# Patient Record
Sex: FEMALE | Race: WHITE | NOT HISPANIC OR LATINO
[De-identification: names, ages, dates, MRNs, and addresses within clinical notes are randomized per-mention and may not be internally consistent; named-entity substitution may affect disease eponyms.]

---

## 2023-10-18 PROBLEM — Z00.00 ENCOUNTER FOR PREVENTIVE HEALTH EXAMINATION: Status: ACTIVE | Noted: 2023-10-18

## 2023-10-19 ENCOUNTER — APPOINTMENT (OUTPATIENT)
Dept: SURGERY | Facility: CLINIC | Age: 63
End: 2023-10-19
Payer: COMMERCIAL

## 2023-10-19 VITALS
SYSTOLIC BLOOD PRESSURE: 151 MMHG | DIASTOLIC BLOOD PRESSURE: 80 MMHG | WEIGHT: 145 LBS | BODY MASS INDEX: 26.68 KG/M2 | HEART RATE: 96 BPM | HEIGHT: 62 IN

## 2023-10-19 DIAGNOSIS — E66.3 OVERWEIGHT: ICD-10-CM

## 2023-10-19 DIAGNOSIS — Z86.79 PERSONAL HISTORY OF OTHER DISEASES OF THE CIRCULATORY SYSTEM: ICD-10-CM

## 2023-10-19 DIAGNOSIS — H43.399 OTHER VITREOUS OPACITIES, UNSPECIFIED EYE: ICD-10-CM

## 2023-10-19 DIAGNOSIS — K57.30 DIVERTICULOSIS OF LARGE INTESTINE W/OUT PERFORATION OR ABSCESS W/OUT BLEEDING: ICD-10-CM

## 2023-10-19 DIAGNOSIS — N60.12 DIFFUSE CYSTIC MASTOPATHY OF LEFT BREAST: ICD-10-CM

## 2023-10-19 DIAGNOSIS — K80.50 CALCULUS OF BILE DUCT W/OUT CHOLANGITIS OR CHOLECYSTITIS W/OUT OBSTRUCTION: ICD-10-CM

## 2023-10-19 DIAGNOSIS — N60.11 DIFFUSE CYSTIC MASTOPATHY OF RIGHT BREAST: ICD-10-CM

## 2023-10-19 DIAGNOSIS — Q43.8 OTHER SPECIFIED CONGENITAL MALFORMATIONS OF INTESTINE: ICD-10-CM

## 2023-10-19 PROCEDURE — 99204 OFFICE O/P NEW MOD 45 MIN: CPT

## 2023-10-19 RX ORDER — L. ACIDOPHILUS/L.BULGARICUS 1MM CELL
TABLET ORAL
Refills: 0 | Status: ACTIVE | COMMUNITY

## 2023-10-19 RX ORDER — LOSARTAN POTASSIUM 50 MG/1
50 TABLET, FILM COATED ORAL
Refills: 0 | Status: ACTIVE | COMMUNITY

## 2023-10-20 ENCOUNTER — NON-APPOINTMENT (OUTPATIENT)
Age: 63
End: 2023-10-20

## 2023-11-07 ENCOUNTER — APPOINTMENT (OUTPATIENT)
Dept: SURGERY | Facility: HOSPITAL | Age: 63
End: 2023-11-07

## 2023-11-07 ENCOUNTER — TRANSCRIPTION ENCOUNTER (OUTPATIENT)
Age: 63
End: 2023-11-07

## 2023-11-10 ENCOUNTER — APPOINTMENT (OUTPATIENT)
Dept: GYNECOLOGIC ONCOLOGY | Facility: CLINIC | Age: 63
End: 2023-11-10
Payer: COMMERCIAL

## 2023-11-10 VITALS
TEMPERATURE: 99.1 F | DIASTOLIC BLOOD PRESSURE: 79 MMHG | HEART RATE: 87 BPM | SYSTOLIC BLOOD PRESSURE: 144 MMHG | WEIGHT: 143 LBS | BODY MASS INDEX: 26.31 KG/M2 | OXYGEN SATURATION: 98 % | HEIGHT: 62 IN

## 2023-11-10 DIAGNOSIS — Z80.51 FAMILY HISTORY OF MALIGNANT NEOPLASM OF KIDNEY: ICD-10-CM

## 2023-11-10 DIAGNOSIS — R87.629 UNSPECIFIED ABNORMAL CYTOLOGICAL FINDINGS IN SPECIMENS FROM VAGINA: ICD-10-CM

## 2023-11-10 DIAGNOSIS — Z84.1 FAMILY HISTORY OF DISORDERS OF KIDNEY AND URETER: ICD-10-CM

## 2023-11-10 DIAGNOSIS — Z80.3 FAMILY HISTORY OF MALIGNANT NEOPLASM OF BREAST: ICD-10-CM

## 2023-11-10 DIAGNOSIS — Z80.8 FAMILY HISTORY OF MALIGNANT NEOPLASM OF OTHER ORGANS OR SYSTEMS: ICD-10-CM

## 2023-11-10 DIAGNOSIS — Z83.49 FAMILY HISTORY OF OTHER ENDOCRINE, NUTRITIONAL AND METABOLIC DISEASES: ICD-10-CM

## 2023-11-10 DIAGNOSIS — Z82.3 FAMILY HISTORY OF STROKE: ICD-10-CM

## 2023-11-10 DIAGNOSIS — Z82.49 FAMILY HISTORY OF ISCHEMIC HEART DISEASE AND OTHER DISEASES OF THE CIRCULATORY SYSTEM: ICD-10-CM

## 2023-11-10 PROCEDURE — 99205 OFFICE O/P NEW HI 60 MIN: CPT

## 2023-11-28 ENCOUNTER — RESULT REVIEW (OUTPATIENT)
Age: 63
End: 2023-11-28

## 2023-12-01 LAB — CANCER AG125 SERPL-ACNC: 609 U/ML

## 2023-12-04 ENCOUNTER — APPOINTMENT (OUTPATIENT)
Dept: GYNECOLOGIC ONCOLOGY | Facility: CLINIC | Age: 63
End: 2023-12-04
Payer: COMMERCIAL

## 2023-12-04 DIAGNOSIS — K57.32 DIVERTICULITIS OF LARGE INTESTINE W/OUT PERFORATION OR ABSCESS W/OUT BLEEDING: ICD-10-CM

## 2023-12-04 PROCEDURE — 99214 OFFICE O/P EST MOD 30 MIN: CPT | Mod: 95

## 2023-12-05 ENCOUNTER — NON-APPOINTMENT (OUTPATIENT)
Age: 63
End: 2023-12-05

## 2023-12-05 ENCOUNTER — APPOINTMENT (OUTPATIENT)
Dept: GYNECOLOGIC ONCOLOGY | Facility: CLINIC | Age: 63
End: 2023-12-05
Payer: COMMERCIAL

## 2023-12-05 VITALS
HEIGHT: 62 IN | HEART RATE: 76 BPM | DIASTOLIC BLOOD PRESSURE: 76 MMHG | BODY MASS INDEX: 26.68 KG/M2 | SYSTOLIC BLOOD PRESSURE: 182 MMHG | WEIGHT: 145 LBS | OXYGEN SATURATION: 99 % | TEMPERATURE: 96.5 F

## 2023-12-05 DIAGNOSIS — Z13.1 ENCOUNTER FOR SCREENING FOR DIABETES MELLITUS: ICD-10-CM

## 2023-12-05 DIAGNOSIS — Z01.818 ENCOUNTER FOR OTHER PREPROCEDURAL EXAMINATION: ICD-10-CM

## 2023-12-05 DIAGNOSIS — R19.00 INTRA-ABDOMINAL AND PELVIC SWELLING, MASS AND LUMP, UNSPECIFIED SITE: ICD-10-CM

## 2023-12-05 PROCEDURE — 99215 OFFICE O/P EST HI 40 MIN: CPT

## 2023-12-06 ENCOUNTER — TRANSCRIPTION ENCOUNTER (OUTPATIENT)
Age: 63
End: 2023-12-06

## 2023-12-06 LAB
AFP-TM SERPL-MCNC: 4.7 NG/ML
ALBUMIN SERPL ELPH-MCNC: 5.1 G/DL
ALP BLD-CCNC: 100 U/L
ALT SERPL-CCNC: 24 U/L
ANION GAP SERPL CALC-SCNC: 18 MMOL/L
APTT BLD: 32.8 SEC
AST SERPL-CCNC: 23 U/L
BASOPHILS # BLD AUTO: 0.1 K/UL
BASOPHILS NFR BLD AUTO: 1.5 %
BILIRUB SERPL-MCNC: 0.5 MG/DL
BUN SERPL-MCNC: 13 MG/DL
CALCIUM SERPL-MCNC: 10.4 MG/DL
CANCER AG125 SERPL-ACNC: 771 U/ML
CANCER AG19-9 SERPL-ACNC: 12 U/ML
CEA SERPL-MCNC: 1.4 NG/ML
CHLORIDE SERPL-SCNC: 101 MMOL/L
CO2 SERPL-SCNC: 22 MMOL/L
CREAT SERPL-MCNC: 0.66 MG/DL
EGFR: 100 ML/MIN/1.73M2
EOSINOPHIL # BLD AUTO: 0.07 K/UL
EOSINOPHIL NFR BLD AUTO: 1 %
ESTIMATED AVERAGE GLUCOSE: 114 MG/DL
GLUCOSE SERPL-MCNC: 92 MG/DL
HBA1C MFR BLD HPLC: 5.6 %
HCT VFR BLD CALC: 44.6 %
HGB BLD-MCNC: 14.1 G/DL
IMM GRANULOCYTES NFR BLD AUTO: 0.4 %
INR PPP: 0.92 RATIO
LDH SERPL-CCNC: 245 U/L
LYMPHOCYTES # BLD AUTO: 1.54 K/UL
LYMPHOCYTES NFR BLD AUTO: 23 %
MAN DIFF?: NORMAL
MCHC RBC-ENTMCNC: 28.8 PG
MCHC RBC-ENTMCNC: 31.6 GM/DL
MCV RBC AUTO: 91 FL
MONOCYTES # BLD AUTO: 0.61 K/UL
MONOCYTES NFR BLD AUTO: 9.1 %
NEUTROPHILS # BLD AUTO: 4.35 K/UL
NEUTROPHILS NFR BLD AUTO: 65 %
PLATELET # BLD AUTO: 294 K/UL
POTASSIUM SERPL-SCNC: 4.7 MMOL/L
PROT SERPL-MCNC: 7.8 G/DL
PT BLD: 10.5 SEC
RBC # BLD: 4.9 M/UL
RBC # FLD: 13.4 %
SODIUM SERPL-SCNC: 141 MMOL/L
WBC # FLD AUTO: 6.7 K/UL

## 2023-12-11 LAB — INHIBIN B: <7 PG/ML

## 2023-12-13 VITALS
WEIGHT: 140.88 LBS | HEIGHT: 62 IN | DIASTOLIC BLOOD PRESSURE: 83 MMHG | RESPIRATION RATE: 18 BRPM | HEART RATE: 88 BPM | OXYGEN SATURATION: 100 % | TEMPERATURE: 98 F | SYSTOLIC BLOOD PRESSURE: 162 MMHG

## 2023-12-13 NOTE — PATIENT PROFILE ADULT - FOOD INSECURITY
Erector spinae Procedure Note    Pre-Procedure   Staff -        CRNA: Terrell Abbott APRN CRNA       Performed By: CRNA       Location: pre-op       Procedure Start/Stop Times: 7/17/2023 1:47 PM and 7/17/2023 1:55 PM       Pre-Anesthestic Checklist: patient identified, IV checked, site marked, risks and benefits discussed, informed consent, monitors and equipment checked, pre-op evaluation, at physician/surgeon's request and post-op pain management  Timeout:       Correct Patient: Yes        Correct Procedure: Yes        Correct Site: Yes        Correct Position: Yes        Correct Laterality: Yes        Site Marked: Yes  Procedure Documentation  Procedure: Erector spinae       Diagnosis: HEMICOLECTOMY       Laterality: bilateral       Patient Position: sitting       Patient Prep/Sterile Barriers: sterile gloves, mask, patient draped       Skin prep: Chloraprep       Insertion Site: T7-8, T10-11.       Needle Type: insulated       Needle Gauge: 20.        Needle Length (Inches): 4        Ultrasound guided       1. Ultrasound was used to identify targeted nerve, plexus, vascular marker, or fascial plane and place a needle adjacent to it in real-time.       2. Ultrasound was used to visualize the spread of anesthetic in close proximity to the above referenced structure.       3. A permanent image is entered into the patient's record.       4. The visualized anatomic structures appeared normal.       5. There were no apparent abnormal pathologic findings.    Assessment/Narrative         The placement was negative for: blood aspirated, painful injection and site bleeding       Paresthesias: No.       Bolus given via needle. no blood aspirated via catheter.        Secured via.        Insertion/Infusion Method: Single Shot       Complications: none       Injection made incrementally with aspirations every 5 mL.    Medication(s) Administered   Bupivacaine 0.25% PF (Infiltration) - Infiltration, Back   50 mL -  "7/17/2023 1:47:00 PM  Dexamethasone 10 mg/mL PF (Perineural) - Infiltration, Back   10 mg - 7/17/2023 1:47:00 PM  Bupivacaine liposome (Exparel) 1.3% LA inj susp (Infiltration) - Infiltration, Back   20 mL - 7/17/2023 1:47:00 PM  Medication Administration Time: 7/17/2023 1:47 PM      FOR [facility] ([facility]) ONLY:   Pain Team Contact information: please page the Pain Team Via [employer]. Search \"Pain\". During daytime hours, please page the attending first. At night please page the resident first.      " no

## 2023-12-13 NOTE — PATIENT PROFILE ADULT - FALL HARM RISK - UNIVERSAL INTERVENTIONS
Bed in lowest position, wheels locked, appropriate side rails in place/Call bell, personal items and telephone in reach/Instruct patient to call for assistance before getting out of bed or chair/Non-slip footwear when patient is out of bed/[place] to call system/Physically safe environment - no spills, clutter or unnecessary equipment/Purposeful Proactive Rounding/Room/bathroom lighting operational, light cord in reach Bed in lowest position, wheels locked, appropriate side rails in place/Call bell, personal items and telephone in reach/Instruct patient to call for assistance before getting out of bed or chair/Non-slip footwear when patient is out of bed/[place] to call system/Physically safe environment - no spills, clutter or unnecessary equipment/Purposeful Proactive Rounding/Room/bathroom lighting operational, light cord in reach

## 2023-12-13 NOTE — PATIENT PROFILE ADULT - FUNCTIONAL ASSESSMENT - BASIC MOBILITY SCORE.
24 Oxybutynin Pregnancy And Lactation Text: This medication is Pregnancy Category B and is considered safe during pregnancy. It is unknown if it is excreted in breast milk.

## 2023-12-13 NOTE — PRE-OP CHECKLIST - 3.
Darin Hooper (Eddie) - # DOS Darin Hooper ([facility]) - # [id number] Darin Hooper ([facility]) - # [phone]

## 2023-12-13 NOTE — PRE-OP CHECKLIST - 2.
EMERGENCY CONTACT - Rosalind Olmedoyariyuliya (Daughter) - [phone] EMERGENCY CONTACT - Rosalind Olmedoyariyuliya (Daughter) - [phone]

## 2023-12-14 ENCOUNTER — APPOINTMENT (OUTPATIENT)
Dept: GYNECOLOGIC ONCOLOGY | Facility: HOSPITAL | Age: 63
End: 2023-12-14
Payer: COMMERCIAL

## 2023-12-14 ENCOUNTER — INPATIENT (INPATIENT)
Facility: HOSPITAL | Age: 63
LOS: 2 days | Discharge: HOME | DRG: 737 | End: 2023-12-17
Attending: OBSTETRICS & GYNECOLOGY | Admitting: OBSTETRICS & GYNECOLOGY
Payer: COMMERCIAL

## 2023-12-14 ENCOUNTER — TRANSCRIPTION ENCOUNTER (OUTPATIENT)
Age: 63
End: 2023-12-14

## 2023-12-14 ENCOUNTER — RESULT REVIEW (OUTPATIENT)
Age: 63
End: 2023-12-14

## 2023-12-14 DIAGNOSIS — Z98.890 OTHER SPECIFIED POSTPROCEDURAL STATES: Chronic | ICD-10-CM

## 2023-12-14 DIAGNOSIS — R74.01 ELEVATION OF LEVELS OF LIVER TRANSAMINASE LEVELS: ICD-10-CM

## 2023-12-14 DIAGNOSIS — C56.9 MALIGNANT NEOPLASM OF UNSPECIFIED OVARY: ICD-10-CM

## 2023-12-14 DIAGNOSIS — C78.6 SECONDARY MALIGNANT NEOPLASM OF RETROPERITONEUM AND PERITONEUM: ICD-10-CM

## 2023-12-14 DIAGNOSIS — Z87.81 PERSONAL HISTORY OF (HEALED) TRAUMATIC FRACTURE: Chronic | ICD-10-CM

## 2023-12-14 DIAGNOSIS — I10 ESSENTIAL (PRIMARY) HYPERTENSION: ICD-10-CM

## 2023-12-14 DIAGNOSIS — R18.8 OTHER ASCITES: ICD-10-CM

## 2023-12-14 DIAGNOSIS — C79.9 SECONDARY MALIGNANT NEOPLASM OF UNSPECIFIED SITE: ICD-10-CM

## 2023-12-14 LAB
ALBUMIN SERPL ELPH-MCNC: 3.9 G/DL — SIGNIFICANT CHANGE UP (ref 3.3–5)
ALBUMIN SERPL ELPH-MCNC: 3.9 G/DL — SIGNIFICANT CHANGE UP (ref 3.3–5)
ALP SERPL-CCNC: 80 U/L — SIGNIFICANT CHANGE UP (ref 40–120)
ALP SERPL-CCNC: 80 U/L — SIGNIFICANT CHANGE UP (ref 40–120)
ALT FLD-CCNC: 223 U/L — HIGH (ref 10–45)
ALT FLD-CCNC: 223 U/L — HIGH (ref 10–45)
ANION GAP SERPL CALC-SCNC: 11 MMOL/L — SIGNIFICANT CHANGE UP (ref 5–17)
ANION GAP SERPL CALC-SCNC: 11 MMOL/L — SIGNIFICANT CHANGE UP (ref 5–17)
AST SERPL-CCNC: 255 U/L — HIGH (ref 10–40)
AST SERPL-CCNC: 255 U/L — HIGH (ref 10–40)
BILIRUB SERPL-MCNC: 0.3 MG/DL — SIGNIFICANT CHANGE UP (ref 0.2–1.2)
BILIRUB SERPL-MCNC: 0.3 MG/DL — SIGNIFICANT CHANGE UP (ref 0.2–1.2)
BLD GP AB SCN SERPL QL: NEGATIVE — SIGNIFICANT CHANGE UP
BLD GP AB SCN SERPL QL: NEGATIVE — SIGNIFICANT CHANGE UP
BUN SERPL-MCNC: 12 MG/DL — SIGNIFICANT CHANGE UP (ref 7–23)
BUN SERPL-MCNC: 12 MG/DL — SIGNIFICANT CHANGE UP (ref 7–23)
CALCIUM SERPL-MCNC: 8.8 MG/DL — SIGNIFICANT CHANGE UP (ref 8.4–10.5)
CALCIUM SERPL-MCNC: 8.8 MG/DL — SIGNIFICANT CHANGE UP (ref 8.4–10.5)
CHLORIDE SERPL-SCNC: 105 MMOL/L — SIGNIFICANT CHANGE UP (ref 96–108)
CHLORIDE SERPL-SCNC: 105 MMOL/L — SIGNIFICANT CHANGE UP (ref 96–108)
CO2 SERPL-SCNC: 21 MMOL/L — LOW (ref 22–31)
CO2 SERPL-SCNC: 21 MMOL/L — LOW (ref 22–31)
CREAT SERPL-MCNC: 0.62 MG/DL — SIGNIFICANT CHANGE UP (ref 0.5–1.3)
CREAT SERPL-MCNC: 0.62 MG/DL — SIGNIFICANT CHANGE UP (ref 0.5–1.3)
EGFR: 101 ML/MIN/1.73M2 — SIGNIFICANT CHANGE UP
EGFR: 101 ML/MIN/1.73M2 — SIGNIFICANT CHANGE UP
GLUCOSE BLDC GLUCOMTR-MCNC: 106 MG/DL — HIGH (ref 70–99)
GLUCOSE BLDC GLUCOMTR-MCNC: 106 MG/DL — HIGH (ref 70–99)
GLUCOSE SERPL-MCNC: 160 MG/DL — HIGH (ref 70–99)
GLUCOSE SERPL-MCNC: 160 MG/DL — HIGH (ref 70–99)
HCT VFR BLD CALC: 39.4 % — SIGNIFICANT CHANGE UP (ref 34.5–45)
HCT VFR BLD CALC: 39.4 % — SIGNIFICANT CHANGE UP (ref 34.5–45)
HGB BLD-MCNC: 13 G/DL — SIGNIFICANT CHANGE UP (ref 11.5–15.5)
HGB BLD-MCNC: 13 G/DL — SIGNIFICANT CHANGE UP (ref 11.5–15.5)
MAGNESIUM SERPL-MCNC: 1.6 MG/DL — SIGNIFICANT CHANGE UP (ref 1.6–2.6)
MAGNESIUM SERPL-MCNC: 1.6 MG/DL — SIGNIFICANT CHANGE UP (ref 1.6–2.6)
MCHC RBC-ENTMCNC: 29.2 PG — SIGNIFICANT CHANGE UP (ref 27–34)
MCHC RBC-ENTMCNC: 29.2 PG — SIGNIFICANT CHANGE UP (ref 27–34)
MCHC RBC-ENTMCNC: 33 GM/DL — SIGNIFICANT CHANGE UP (ref 32–36)
MCHC RBC-ENTMCNC: 33 GM/DL — SIGNIFICANT CHANGE UP (ref 32–36)
MCV RBC AUTO: 88.5 FL — SIGNIFICANT CHANGE UP (ref 80–100)
MCV RBC AUTO: 88.5 FL — SIGNIFICANT CHANGE UP (ref 80–100)
NRBC # BLD: 0 /100 WBCS — SIGNIFICANT CHANGE UP (ref 0–0)
NRBC # BLD: 0 /100 WBCS — SIGNIFICANT CHANGE UP (ref 0–0)
PHOSPHATE SERPL-MCNC: 3.6 MG/DL — SIGNIFICANT CHANGE UP (ref 2.5–4.5)
PHOSPHATE SERPL-MCNC: 3.6 MG/DL — SIGNIFICANT CHANGE UP (ref 2.5–4.5)
PLATELET # BLD AUTO: 265 K/UL — SIGNIFICANT CHANGE UP (ref 150–400)
PLATELET # BLD AUTO: 265 K/UL — SIGNIFICANT CHANGE UP (ref 150–400)
POTASSIUM SERPL-MCNC: 3.8 MMOL/L — SIGNIFICANT CHANGE UP (ref 3.5–5.3)
POTASSIUM SERPL-MCNC: 3.8 MMOL/L — SIGNIFICANT CHANGE UP (ref 3.5–5.3)
POTASSIUM SERPL-SCNC: 3.8 MMOL/L — SIGNIFICANT CHANGE UP (ref 3.5–5.3)
POTASSIUM SERPL-SCNC: 3.8 MMOL/L — SIGNIFICANT CHANGE UP (ref 3.5–5.3)
PROT SERPL-MCNC: 6.5 G/DL — SIGNIFICANT CHANGE UP (ref 6–8.3)
PROT SERPL-MCNC: 6.5 G/DL — SIGNIFICANT CHANGE UP (ref 6–8.3)
RBC # BLD: 4.45 M/UL — SIGNIFICANT CHANGE UP (ref 3.8–5.2)
RBC # BLD: 4.45 M/UL — SIGNIFICANT CHANGE UP (ref 3.8–5.2)
RBC # FLD: 13 % — SIGNIFICANT CHANGE UP (ref 10.3–14.5)
RBC # FLD: 13 % — SIGNIFICANT CHANGE UP (ref 10.3–14.5)
RH IG SCN BLD-IMP: POSITIVE — SIGNIFICANT CHANGE UP
RH IG SCN BLD-IMP: POSITIVE — SIGNIFICANT CHANGE UP
SODIUM SERPL-SCNC: 137 MMOL/L — SIGNIFICANT CHANGE UP (ref 135–145)
SODIUM SERPL-SCNC: 137 MMOL/L — SIGNIFICANT CHANGE UP (ref 135–145)
WBC # BLD: 16 K/UL — HIGH (ref 3.8–10.5)
WBC # BLD: 16 K/UL — HIGH (ref 3.8–10.5)
WBC # FLD AUTO: 16 K/UL — HIGH (ref 3.8–10.5)
WBC # FLD AUTO: 16 K/UL — HIGH (ref 3.8–10.5)

## 2023-12-14 PROCEDURE — 88307 TISSUE EXAM BY PATHOLOGIST: CPT | Mod: 26

## 2023-12-14 PROCEDURE — 88309 TISSUE EXAM BY PATHOLOGIST: CPT | Mod: 26

## 2023-12-14 PROCEDURE — 39560 RESECT DIAPHRAGM SIMPLE: CPT | Mod: 62

## 2023-12-14 PROCEDURE — 88304 TISSUE EXAM BY PATHOLOGIST: CPT | Mod: 26

## 2023-12-14 PROCEDURE — 58953 TAH RAD DISSECT FOR DEBULK: CPT

## 2023-12-14 PROCEDURE — 58952 RESECT OVARIAN MALIGNANCY: CPT

## 2023-12-14 PROCEDURE — 71045 X-RAY EXAM CHEST 1 VIEW: CPT | Mod: 26

## 2023-12-14 PROCEDURE — 88305 TISSUE EXAM BY PATHOLOGIST: CPT | Mod: 26

## 2023-12-14 PROCEDURE — 88331 PATH CONSLTJ SURG 1 BLK 1SPC: CPT | Mod: 26

## 2023-12-14 PROCEDURE — 58953 TAH RAD DISSECT FOR DEBULK: CPT | Mod: 82

## 2023-12-14 RX ORDER — LOSARTAN POTASSIUM 100 MG/1
50 TABLET, FILM COATED ORAL DAILY
Refills: 0 | Status: DISCONTINUED | OUTPATIENT
Start: 2023-12-14 | End: 2023-12-17

## 2023-12-14 RX ORDER — HYDRALAZINE HCL 50 MG
5 TABLET ORAL ONCE
Refills: 0 | Status: COMPLETED | OUTPATIENT
Start: 2023-12-14 | End: 2023-12-14

## 2023-12-14 RX ORDER — ACETAMINOPHEN 500 MG
1000 TABLET ORAL ONCE
Refills: 0 | Status: COMPLETED | OUTPATIENT
Start: 2023-12-14 | End: 2023-12-14

## 2023-12-14 RX ORDER — HYDROMORPHONE HYDROCHLORIDE 2 MG/ML
0.5 INJECTION INTRAMUSCULAR; INTRAVENOUS; SUBCUTANEOUS
Refills: 0 | Status: DISCONTINUED | OUTPATIENT
Start: 2023-12-14 | End: 2023-12-14

## 2023-12-14 RX ORDER — SODIUM CHLORIDE 9 MG/ML
1000 INJECTION, SOLUTION INTRAVENOUS
Refills: 0 | Status: DISCONTINUED | OUTPATIENT
Start: 2023-12-14 | End: 2023-12-15

## 2023-12-14 RX ORDER — SIMETHICONE 80 MG/1
80 TABLET, CHEWABLE ORAL EVERY 6 HOURS
Refills: 0 | Status: DISCONTINUED | OUTPATIENT
Start: 2023-12-14 | End: 2023-12-17

## 2023-12-14 RX ORDER — OXYCODONE HYDROCHLORIDE 5 MG/1
5 TABLET ORAL EVERY 4 HOURS
Refills: 0 | Status: DISCONTINUED | OUTPATIENT
Start: 2023-12-14 | End: 2023-12-17

## 2023-12-14 RX ORDER — HEPARIN SODIUM 5000 [USP'U]/ML
5000 INJECTION INTRAVENOUS; SUBCUTANEOUS ONCE
Refills: 0 | Status: COMPLETED | OUTPATIENT
Start: 2023-12-14 | End: 2023-12-14

## 2023-12-14 RX ORDER — KETOROLAC TROMETHAMINE 30 MG/ML
15 SYRINGE (ML) INJECTION EVERY 8 HOURS
Refills: 0 | Status: DISCONTINUED | OUTPATIENT
Start: 2023-12-14 | End: 2023-12-15

## 2023-12-14 RX ORDER — CELECOXIB 200 MG/1
400 CAPSULE ORAL ONCE
Refills: 0 | Status: COMPLETED | OUTPATIENT
Start: 2023-12-14 | End: 2023-12-14

## 2023-12-14 RX ORDER — SODIUM CHLORIDE 9 MG/ML
1000 INJECTION, SOLUTION INTRAVENOUS
Refills: 0 | Status: DISCONTINUED | OUTPATIENT
Start: 2023-12-14 | End: 2023-12-14

## 2023-12-14 RX ORDER — MAGNESIUM SULFATE 500 MG/ML
2 VIAL (ML) INJECTION ONCE
Refills: 0 | Status: COMPLETED | OUTPATIENT
Start: 2023-12-14 | End: 2023-12-14

## 2023-12-14 RX ORDER — OXYCODONE HYDROCHLORIDE 5 MG/1
10 TABLET ORAL EVERY 4 HOURS
Refills: 0 | Status: DISCONTINUED | OUTPATIENT
Start: 2023-12-14 | End: 2023-12-17

## 2023-12-14 RX ORDER — ONDANSETRON 8 MG/1
8 TABLET, FILM COATED ORAL EVERY 8 HOURS
Refills: 0 | Status: DISCONTINUED | OUTPATIENT
Start: 2023-12-14 | End: 2023-12-17

## 2023-12-14 RX ORDER — METOCLOPRAMIDE HCL 10 MG
10 TABLET ORAL EVERY 8 HOURS
Refills: 0 | Status: DISCONTINUED | OUTPATIENT
Start: 2023-12-14 | End: 2023-12-17

## 2023-12-14 RX ORDER — ACETAMINOPHEN 500 MG
1000 TABLET ORAL EVERY 6 HOURS
Refills: 0 | Status: COMPLETED | OUTPATIENT
Start: 2023-12-14 | End: 2023-12-15

## 2023-12-14 RX ORDER — BACILLUS COAGULANS/INULIN 1B-250 MG
1 CAPSULE ORAL
Refills: 0 | DISCHARGE

## 2023-12-14 RX ORDER — LOSARTAN POTASSIUM 100 MG/1
1 TABLET, FILM COATED ORAL
Refills: 0 | DISCHARGE

## 2023-12-14 RX ADMIN — OXYCODONE HYDROCHLORIDE 10 MILLIGRAM(S): 5 TABLET ORAL at 20:56

## 2023-12-14 RX ADMIN — HYDROMORPHONE HYDROCHLORIDE 0.5 MILLIGRAM(S): 2 INJECTION INTRAMUSCULAR; INTRAVENOUS; SUBCUTANEOUS at 16:35

## 2023-12-14 RX ADMIN — LOSARTAN POTASSIUM 50 MILLIGRAM(S): 100 TABLET, FILM COATED ORAL at 18:37

## 2023-12-14 RX ADMIN — Medication 15 MILLIGRAM(S): at 21:58

## 2023-12-14 RX ADMIN — HYDROMORPHONE HYDROCHLORIDE 0.5 MILLIGRAM(S): 2 INJECTION INTRAMUSCULAR; INTRAVENOUS; SUBCUTANEOUS at 17:59

## 2023-12-14 RX ADMIN — HEPARIN SODIUM 5000 UNIT(S): 5000 INJECTION INTRAVENOUS; SUBCUTANEOUS at 10:37

## 2023-12-14 RX ADMIN — HYDROMORPHONE HYDROCHLORIDE 0.5 MILLIGRAM(S): 2 INJECTION INTRAMUSCULAR; INTRAVENOUS; SUBCUTANEOUS at 18:25

## 2023-12-14 RX ADMIN — OXYCODONE HYDROCHLORIDE 10 MILLIGRAM(S): 5 TABLET ORAL at 19:50

## 2023-12-14 RX ADMIN — HYDROMORPHONE HYDROCHLORIDE 0.5 MILLIGRAM(S): 2 INJECTION INTRAMUSCULAR; INTRAVENOUS; SUBCUTANEOUS at 17:20

## 2023-12-14 RX ADMIN — Medication 25 GRAM(S): at 19:50

## 2023-12-14 RX ADMIN — HYDROMORPHONE HYDROCHLORIDE 0.5 MILLIGRAM(S): 2 INJECTION INTRAMUSCULAR; INTRAVENOUS; SUBCUTANEOUS at 16:50

## 2023-12-14 RX ADMIN — CELECOXIB 400 MILLIGRAM(S): 200 CAPSULE ORAL at 10:29

## 2023-12-14 RX ADMIN — Medication 5 MILLIGRAM(S): at 18:28

## 2023-12-14 RX ADMIN — Medication 1000 MILLIGRAM(S): at 21:58

## 2023-12-14 RX ADMIN — Medication 1000 MILLIGRAM(S): at 10:29

## 2023-12-14 RX ADMIN — HYDROMORPHONE HYDROCHLORIDE 0.5 MILLIGRAM(S): 2 INJECTION INTRAMUSCULAR; INTRAVENOUS; SUBCUTANEOUS at 17:53

## 2023-12-14 NOTE — BRIEF OPERATIVE NOTE - NSICDXBRIEFPROCEDURE_GEN_ALL_CORE_FT
PROCEDURES:  Laparotomy, exploratory, with abdominal hysterectomy, and BSO 14-Dec-2023 16:05:25  [place]  Omentectomy 14-Dec-2023 16:05:31  [place]

## 2023-12-14 NOTE — DISCHARGE NOTE PROVIDER - NSDCMRMEDTOKEN_GEN_ALL_CORE_FT
losartan 50 mg oral tablet: 1 tab(s) orally once a day  Probiotic Formula (Bacillus Coagulans) oral capsule: 1 cap(s) orally   acetaminophen 325 mg oral tablet: 2 tab(s) orally every 6 hours  ibuprofen 600 mg oral tablet: 1 tab(s) orally every 6 hours  losartan 50 mg oral tablet: 1 tab(s) orally once a day  Lovenox 40 mg/0.4 mL injectable solution: 40 milligram(s) subcutaneously once a day (at bedtime)  Probiotic Formula (Bacillus Coagulans) oral capsule: 1 cap(s) orally

## 2023-12-14 NOTE — DISCHARGE NOTE PROVIDER - NSDCCPCAREPLAN_GEN_ALL_CORE_FT
PRINCIPAL DISCHARGE DIAGNOSIS  Diagnosis: Status post total hysterectomy  Assessment and Plan of Treatment:

## 2023-12-14 NOTE — BRIEF OPERATIVE NOTE - SPECIMENS
1. Right fallopian tube and ovary 2. Left fallopian tube and ovary 3. Uterus and cervix 4. Omentum 5. Right diaphragmatic nodule 6. Right diaphragmatic nodule #2 7. Nodule over inferior vena cava

## 2023-12-14 NOTE — DISCHARGE NOTE PROVIDER - NSDCFUSCHEDAPPT_GEN_ALL_CORE_FT
[facility]  GYNONC [address]  Scheduled Appointment: 12/21/2023    Carey Del Toro  [facility]  GYNONC [address]  Scheduled Appointment: 01/12/2024     [facility]  GYNONC [address]  Scheduled Appointment: 12/21/2023    Carey Del Toro  [facility]  GYNONC [address]  Scheduled Appointment: 01/12/2024

## 2023-12-14 NOTE — H&P ADULT - ASSESSMENT
64yo P3 admitted post-operatively after scheduled diagnostic laparoscopy, exploratory laparotomy, total abdominal hysterectomy bilateral salpingo-oophorectomy, omentectomy, removal of diaphragmatic nodule and repair of defect EBL 100ml    Plan   - Clear liquid diet  - Stat labs in PACU  - Portable CXR in PACU to check for resolution of pneumothorax, and repeat CXR 12/15 AM  - ERAS protocol  - Starting Hb 14.1, Hct 44   - Starting Cr 0.6  - Admit to telemetry    FRANKIE Kelly  Gynecologic Oncology Fellow   62yo P3 admitted post-operatively after scheduled diagnostic laparoscopy, exploratory laparotomy, total abdominal hysterectomy bilateral salpingo-oophorectomy, omentectomy, removal of diaphragmatic nodule and repair of defect EBL 100ml    Plan   - Clear liquid diet  - Stat labs in PACU  - Portable CXR in PACU to check for resolution of pneumothorax, and repeat CXR 12/15 AM  - ERAS protocol  - Starting Hb 14.1, Hct 44   - Starting Cr 0.6  - Admit to telemetry    FRANKIE Kelly  Gynecologic Oncology Fellow

## 2023-12-14 NOTE — DISCHARGE NOTE PROVIDER - DISCHARGE SERVICE FOR PATIENT
on the discharge service for the patient. I have reviewed and made amendments to the documentation where necessary. Detail Level: Detailed Quality 131: Pain Assessment And Follow-Up: Pain assessment NOT documented as being performed, documentation the patient is not eligible for a pain assessment using a standardized tool Quality 110: Preventive Care And Screening: Influenza Immunization: Influenza Immunization Administered during Influenza season

## 2023-12-14 NOTE — DISCHARGE NOTE PROVIDER - NSDCCPTREATMENT_GEN_ALL_CORE_FT
PRINCIPAL PROCEDURE  Procedure: Laparotomy, exploratory, with abdominal hysterectomy, and BSO  Findings and Treatment:       SECONDARY PROCEDURE  Procedure: Laparotomy, exploratory, with abdominal hysterectomy, and BSO  Findings and Treatment:     Procedure: Omentectomy  Findings and Treatment:

## 2023-12-14 NOTE — DISCHARGE NOTE PROVIDER - CARE PROVIDER_API CALL
Carey Del Toro.  Gynecologic Oncology  [address]  [place] [address]  Phone: [phone]  Fax: [phone]  Follow Up Time:    Carey Del Toro.  Gynecologic Oncology  [address]  [place] [address]  Phone: [phone]  Fax: [phone]  Follow Up Time:

## 2023-12-14 NOTE — PRE-ANESTHESIA EVALUATION ADULT - NSANTHOSAYNRD_GEN_A_CORE
No. BETHANIE screening performed.  STOP BANG Legend: 0-2 = LOW Risk; 3-4 = INTERMEDIATE Risk; 5-8 = HIGH Risk

## 2023-12-14 NOTE — DISCHARGE NOTE PROVIDER - HOSPITAL COURSE
Patient underwent an uncomplicated dx l/s, ex-lap, JASMYN, BSO, omentectomy, removal of diaphragmatic nodule and repair of defect by CT surgery and surg/onc. Patient’s postoperative course was unremarkable and she remained hemodynamically stable and afebrile throughout. Upon discharge the patient is ambulating and voiding spontaneously, tolerating oral intake, pain was well controlled with oral medication, and vital signs were stable.   Patient underwent an uncomplicated dx l/s, ex-lap, JASMYN, BSO, omentectomy, removal of diaphragmatic nodule and repair of defect by CT surgery and surg/onc. Patient’s postoperative course was unremarkable and she remained hemodynamically stable and afebrile throughout. Upon discharge the patient is ambulating and voiding spontaneously, tolerating oral intake, pain was well controlled with oral medication, and vital signs were stable. Pt discharged to home with lovenox and GYN Onc follow up.

## 2023-12-14 NOTE — DISCHARGE NOTE PROVIDER - CARE PROVIDERS DIRECT ADDRESSES
[email or web address]

## 2023-12-14 NOTE — H&P ADULT - HISTORY OF PRESENT ILLNESS
Patient is a 62yo P3 admitted post-operatively after scheduled diagnostic laparoscopy, exploratory laparotomy, total abdominal hysterectomy bilateral salpingo-oophorectomy, omentectomy, removal of diaphragmatic nodule and repair of defect    Problem:  1) Peritoneal Carcinomatosis  2) Elevated  609 (11/10/23)  3) Genetic testing neg > 10 years ago.  ?  Previous Therapies:  1) TVUS 10/30/23:  Uterus: 7.8 cm x 4.3 cm x 4.4 cm. 3.0 cm fundal fibroid. 7.2 cm right fundal pedunculated fibroid on a stalk.  Endometrium: 2 mm. Within normal limits. Trace fluid in the cavity  Right ovary: 3.4 cm x 2.2 cm x 2.1 cm within normal limits  Left ovary: Not seen 8.9 cm heterogeneous left adnexal mass of uncertain etiology  Fluid: Trace free fluid.  Impression:  Enlarged multifibroid uterus including a 7.2 cm dominant right fundal pedunculated fibroid on a stalk. 8.9 cm heterogeneous left adnexal mass of uncertain etiology. Left ovary was not visualized. Pedunculated fibroid versus left adnexal mass is not excluded. Recommend MRI pelvis with and without contrast for further clarification.  ?  2) CT 10/17/23:  Lower chest: No significant abnormality  Liver: Elongated right hepatic lobe 21 cm craniocaudad. No focal lesion  Gallbladder: Numerous count oculi and lumen of normal sized gallbladder. Slight gallbladder wall thickening. No pericholecystic fluid  Spleen: Normal in size and configuration  Pancreas: Unremarkable  Adrenals: No mass  Kidneys/ureters: No calculi, mass, or hydronephrosis  Bladder: Prominently fluid distended but otherwise grossly unremarkable  Reproductive organs: Uterine fundus slightly to right of midline. Lobulated right superior para uterine mass measuring 7 x 5.5 x 7 cm.  Bowel: Diverticuli in sigmoid colon with long segmental wall thickening of the upper sigmoid colon and prominent pericolic inflammatory change. No bowel obstruction. Appendix appears normal in caliber  Peritoneum: Small amount of free fluid in the posterior pelvis. No free air  Vessels: No aneurysm. Mild arthrosclerotic calcification.  Retroperitoneum/lymph nodes: No lymphadenopathy  Abdominal wall: No abnormal mass or fluid collection  Bones: No fracture or focal destructive or blastic lesion  Impression:  Findings compatible with acute diverticulitis of the upper sigmoid colon. Cholelithiasis and slight gallbladder wall thickening. No pericholecystic fluid or biliary duct dilation  A lobulated right superior periuterine mass measuring 7 x 5.5 x 7 cm may be a pedunculated myoma, with masses of other etiology not excluded. Follow-up pelvic ultrasound can be performed for further evaluation in this regard  ?  3) MRI 23:  FINDINGS:  UTERUS: 8.6 x 3.7 x 3.5 cm. Fundal intramural/subserosal fibroid, 3.5 x 2.6 cm (401, 32)  ENDOMETRIUM: Within normal limits.  JUNCTIONAL ZONE: Within normal limits.  RIGHT OVARY: 8.5 x 7.0 x 7.5 cm (TV, AP, CC) predominantly solid mass replacing the right ovary with cystic components. Dominant 4 cm cystic component contains mildly T1 hyperintense proteinaceous fluid.  LEFT OVARY: 8.0 x 5.3 x 4.0 cm (AP, CC, TV) predominantly solid mass replacing the left ovary with small cystic components.  ADNEXA: As above.  BLADDER: Within normal limits.  LYMPH NODES: No pelvic lymphadenopathy.  VISUALIZED PORTIONS:  ABDOMINAL ORGANS: Within normal limits.  BOWEL: Within normal limits.  PERITONEUM: Small volume ascites. Findings suspicious for peritoneal carcinomatosis, subtle peritoneal enhancement and at least one discrete peritoneal nodule identified, a 5 mm posterior pelvic nodule (301, 25).  VESSELS: Within normal small fat-containing umbilical hernia.  BONES: No aggressive osseous lesion.  IMPRESSION:  Bilateral ovarian masses and findings suspicious for peritoneal carcinomatosis with small volume ascites. In light of the elevated CA-125 levels, primary ovarian malignancy is most likely although differential would include ovarian metastases.    GYNHx; h/o 1 abnormal pap in 2019- HPV +, colpo and f/b cryo. h/o fibroid 11 years ago. denies ovarian cyst.  LMP 2017  OBHx:  x 3  ?  PMHx: Congenital redundant colon, HTN (gallstones- talked to a surgeon who recommended sx before it gets worst)  Meds: Losartan 50mg daily, probiotic  Allergies: Azithromycin, Cephalexin, Avocado- pruritis, Hives  ?  Sx: Breast Surgery Lumpectomy  ?  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  Colonoscopy: - normal repeat 10 years  PAP: 2023- normal  Mammo: 2023- abnormal- biopsy negative.    Physical Exam  Gen: Well-appearing. NAD.  Resp: Breathing comfortably on RA.     Patient is a 62yo P3 admitted post-operatively after scheduled diagnostic laparoscopy, exploratory laparotomy, total abdominal hysterectomy bilateral salpingo-oophorectomy, omentectomy, removal of diaphragmatic nodule and repair of defect    Problem:  1) Peritoneal Carcinomatosis  2) Elevated  609 (11/10/23)  3) Genetic testing neg > 10 years ago.  ?  Previous Therapies:  1) TVUS 10/30/23:  Uterus: 7.8 cm x 4.3 cm x 4.4 cm. 3.0 cm fundal fibroid. 7.2 cm right fundal pedunculated fibroid on a stalk.  Endometrium: 2 mm. Within normal limits. Trace fluid in the cavity  Right ovary: 3.4 cm x 2.2 cm x 2.1 cm within normal limits  Left ovary: Not seen 8.9 cm heterogeneous left adnexal mass of uncertain etiology  Fluid: Trace free fluid.  Impression:  Enlarged multifibroid uterus including a 7.2 cm dominant right fundal pedunculated fibroid on a stalk. 8.9 cm heterogeneous left adnexal mass of uncertain etiology. Left ovary was not visualized. Pedunculated fibroid versus left adnexal mass is not excluded. Recommend MRI pelvis with and without contrast for further clarification.  ?  2) CT 10/17/23:  Lower chest: No significant abnormality  Liver: Elongated right hepatic lobe 21 cm craniocaudad. No focal lesion  Gallbladder: Numerous count oculi and lumen of normal sized gallbladder. Slight gallbladder wall thickening. No pericholecystic fluid  Spleen: Normal in size and configuration  Pancreas: Unremarkable  Adrenals: No mass  Kidneys/ureters: No calculi, mass, or hydronephrosis  Bladder: Prominently fluid distended but otherwise grossly unremarkable  Reproductive organs: Uterine fundus slightly to right of midline. Lobulated right superior para uterine mass measuring 7 x 5.5 x 7 cm.  Bowel: Diverticuli in sigmoid colon with long segmental wall thickening of the upper sigmoid colon and prominent pericolic inflammatory change. No bowel obstruction. Appendix appears normal in caliber  Peritoneum: Small amount of free fluid in the posterior pelvis. No free air  Vessels: No aneurysm. Mild arthrosclerotic calcification.  Retroperitoneum/lymph nodes: No lymphadenopathy  Abdominal wall: No abnormal mass or fluid collection  Bones: No fracture or focal destructive or blastic lesion  Impression:  Findings compatible with acute diverticulitis of the upper sigmoid colon. Cholelithiasis and slight gallbladder wall thickening. No pericholecystic fluid or biliary duct dilation  A lobulated right superior periuterine mass measuring 7 x 5.5 x 7 cm may be a pedunculated myoma, with masses of other etiology not excluded. Follow-up pelvic ultrasound can be performed for further evaluation in this regard  ?  3) MRI 23:  FINDINGS:  UTERUS: 8.6 x 3.7 x 3.5 cm. Fundal intramural/subserosal fibroid, 3.5 x 2.6 cm (401, 32)  ENDOMETRIUM: Within normal limits.  JUNCTIONAL ZONE: Within normal limits.  RIGHT OVARY: 8.5 x 7.0 x 7.5 cm (TV, AP, CC) predominantly solid mass replacing the right ovary with cystic components. Dominant 4 cm cystic component contains mildly T1 hyperintense proteinaceous fluid.  LEFT OVARY: 8.0 x 5.3 x 4.0 cm (AP, CC, TV) predominantly solid mass replacing the left ovary with small cystic components.  ADNEXA: As above.  BLADDER: Within normal limits.  LYMPH NODES: No pelvic lymphadenopathy.  VISUALIZED PORTIONS:  ABDOMINAL ORGANS: Within normal limits.  BOWEL: Within normal limits.  PERITONEUM: Small volume ascites. Findings suspicious for peritoneal carcinomatosis, subtle peritoneal enhancement and at least one discrete peritoneal nodule identified, a 5 mm posterior pelvic nodule (301, 25).  VESSELS: Within normal small fat-containing umbilical hernia.  BONES: No aggressive osseous lesion.  IMPRESSION:  Bilateral ovarian masses and findings suspicious for peritoneal carcinomatosis with small volume ascites. In light of the elevated CA-125 levels, primary ovarian malignancy is most likely although differential would include ovarian metastases.    GYNHx; h/o 1 abnormal pap in 2019- HPV +, colpo and f/b cryo. h/o fibroid 11 years ago. denies ovarian cyst.  LMP 2017  OBHx:  x 3  ?  PMHx: Congenital redundant colon, HTN (gallstones- talked to a surgeon who recommended sx before it gets worst)  Meds: Losartan 50mg daily, probiotic  Allergies: Azithromycin, Cephalexin, Avocado- pruritis, Hives  ?  Sx: Breast Surgery Lumpectomy  ?  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  Colonoscopy: - normal repeat 10 years  PAP: 2023- normal  Mammo: 2023- abnormal- biopsy negative.    Physical Exam  Gen: Well-appearing. NAD.  Resp: Breathing comfortably on RA.

## 2023-12-15 LAB
ALBUMIN SERPL ELPH-MCNC: 3.5 G/DL — SIGNIFICANT CHANGE UP (ref 3.3–5)
ALBUMIN SERPL ELPH-MCNC: 3.5 G/DL — SIGNIFICANT CHANGE UP (ref 3.3–5)
ALP SERPL-CCNC: 65 U/L — SIGNIFICANT CHANGE UP (ref 40–120)
ALP SERPL-CCNC: 65 U/L — SIGNIFICANT CHANGE UP (ref 40–120)
ALT FLD-CCNC: 153 U/L — HIGH (ref 10–45)
ALT FLD-CCNC: 153 U/L — HIGH (ref 10–45)
ANION GAP SERPL CALC-SCNC: 9 MMOL/L — SIGNIFICANT CHANGE UP (ref 5–17)
ANION GAP SERPL CALC-SCNC: 9 MMOL/L — SIGNIFICANT CHANGE UP (ref 5–17)
AST SERPL-CCNC: 128 U/L — HIGH (ref 10–40)
AST SERPL-CCNC: 128 U/L — HIGH (ref 10–40)
BILIRUB SERPL-MCNC: 0.8 MG/DL — SIGNIFICANT CHANGE UP (ref 0.2–1.2)
BILIRUB SERPL-MCNC: 0.8 MG/DL — SIGNIFICANT CHANGE UP (ref 0.2–1.2)
BUN SERPL-MCNC: 9 MG/DL — SIGNIFICANT CHANGE UP (ref 7–23)
BUN SERPL-MCNC: 9 MG/DL — SIGNIFICANT CHANGE UP (ref 7–23)
CALCIUM SERPL-MCNC: 8.6 MG/DL — SIGNIFICANT CHANGE UP (ref 8.4–10.5)
CALCIUM SERPL-MCNC: 8.6 MG/DL — SIGNIFICANT CHANGE UP (ref 8.4–10.5)
CHLORIDE SERPL-SCNC: 101 MMOL/L — SIGNIFICANT CHANGE UP (ref 96–108)
CHLORIDE SERPL-SCNC: 101 MMOL/L — SIGNIFICANT CHANGE UP (ref 96–108)
CO2 SERPL-SCNC: 25 MMOL/L — SIGNIFICANT CHANGE UP (ref 22–31)
CO2 SERPL-SCNC: 25 MMOL/L — SIGNIFICANT CHANGE UP (ref 22–31)
CREAT SERPL-MCNC: 0.62 MG/DL — SIGNIFICANT CHANGE UP (ref 0.5–1.3)
CREAT SERPL-MCNC: 0.62 MG/DL — SIGNIFICANT CHANGE UP (ref 0.5–1.3)
EGFR: 101 ML/MIN/1.73M2 — SIGNIFICANT CHANGE UP
EGFR: 101 ML/MIN/1.73M2 — SIGNIFICANT CHANGE UP
GLUCOSE BLDC GLUCOMTR-MCNC: 103 MG/DL — HIGH (ref 70–99)
GLUCOSE BLDC GLUCOMTR-MCNC: 103 MG/DL — HIGH (ref 70–99)
GLUCOSE BLDC GLUCOMTR-MCNC: 113 MG/DL — HIGH (ref 70–99)
GLUCOSE BLDC GLUCOMTR-MCNC: 113 MG/DL — HIGH (ref 70–99)
GLUCOSE SERPL-MCNC: 134 MG/DL — HIGH (ref 70–99)
GLUCOSE SERPL-MCNC: 134 MG/DL — HIGH (ref 70–99)
HCT VFR BLD CALC: 37.6 % — SIGNIFICANT CHANGE UP (ref 34.5–45)
HCT VFR BLD CALC: 37.6 % — SIGNIFICANT CHANGE UP (ref 34.5–45)
HCV AB S/CO SERPL IA: 0.06 S/CO — SIGNIFICANT CHANGE UP
HCV AB S/CO SERPL IA: 0.06 S/CO — SIGNIFICANT CHANGE UP
HCV AB SERPL-IMP: SIGNIFICANT CHANGE UP
HCV AB SERPL-IMP: SIGNIFICANT CHANGE UP
HGB BLD-MCNC: 12.6 G/DL — SIGNIFICANT CHANGE UP (ref 11.5–15.5)
HGB BLD-MCNC: 12.6 G/DL — SIGNIFICANT CHANGE UP (ref 11.5–15.5)
MAGNESIUM SERPL-MCNC: 2 MG/DL — SIGNIFICANT CHANGE UP (ref 1.6–2.6)
MAGNESIUM SERPL-MCNC: 2 MG/DL — SIGNIFICANT CHANGE UP (ref 1.6–2.6)
MCHC RBC-ENTMCNC: 29.7 PG — SIGNIFICANT CHANGE UP (ref 27–34)
MCHC RBC-ENTMCNC: 29.7 PG — SIGNIFICANT CHANGE UP (ref 27–34)
MCHC RBC-ENTMCNC: 33.5 GM/DL — SIGNIFICANT CHANGE UP (ref 32–36)
MCHC RBC-ENTMCNC: 33.5 GM/DL — SIGNIFICANT CHANGE UP (ref 32–36)
MCV RBC AUTO: 88.7 FL — SIGNIFICANT CHANGE UP (ref 80–100)
MCV RBC AUTO: 88.7 FL — SIGNIFICANT CHANGE UP (ref 80–100)
NRBC # BLD: 0 /100 WBCS — SIGNIFICANT CHANGE UP (ref 0–0)
NRBC # BLD: 0 /100 WBCS — SIGNIFICANT CHANGE UP (ref 0–0)
PHOSPHATE SERPL-MCNC: 3.3 MG/DL — SIGNIFICANT CHANGE UP (ref 2.5–4.5)
PHOSPHATE SERPL-MCNC: 3.3 MG/DL — SIGNIFICANT CHANGE UP (ref 2.5–4.5)
PLATELET # BLD AUTO: 264 K/UL — SIGNIFICANT CHANGE UP (ref 150–400)
PLATELET # BLD AUTO: 264 K/UL — SIGNIFICANT CHANGE UP (ref 150–400)
POTASSIUM SERPL-MCNC: 4.1 MMOL/L — SIGNIFICANT CHANGE UP (ref 3.5–5.3)
POTASSIUM SERPL-MCNC: 4.1 MMOL/L — SIGNIFICANT CHANGE UP (ref 3.5–5.3)
POTASSIUM SERPL-SCNC: 4.1 MMOL/L — SIGNIFICANT CHANGE UP (ref 3.5–5.3)
POTASSIUM SERPL-SCNC: 4.1 MMOL/L — SIGNIFICANT CHANGE UP (ref 3.5–5.3)
PROT SERPL-MCNC: 5.8 G/DL — LOW (ref 6–8.3)
PROT SERPL-MCNC: 5.8 G/DL — LOW (ref 6–8.3)
RBC # BLD: 4.24 M/UL — SIGNIFICANT CHANGE UP (ref 3.8–5.2)
RBC # BLD: 4.24 M/UL — SIGNIFICANT CHANGE UP (ref 3.8–5.2)
RBC # FLD: 13.3 % — SIGNIFICANT CHANGE UP (ref 10.3–14.5)
RBC # FLD: 13.3 % — SIGNIFICANT CHANGE UP (ref 10.3–14.5)
SODIUM SERPL-SCNC: 135 MMOL/L — SIGNIFICANT CHANGE UP (ref 135–145)
SODIUM SERPL-SCNC: 135 MMOL/L — SIGNIFICANT CHANGE UP (ref 135–145)
WBC # BLD: 13.83 K/UL — HIGH (ref 3.8–10.5)
WBC # BLD: 13.83 K/UL — HIGH (ref 3.8–10.5)
WBC # FLD AUTO: 13.83 K/UL — HIGH (ref 3.8–10.5)
WBC # FLD AUTO: 13.83 K/UL — HIGH (ref 3.8–10.5)

## 2023-12-15 PROCEDURE — 71045 X-RAY EXAM CHEST 1 VIEW: CPT | Mod: 26

## 2023-12-15 RX ORDER — ENOXAPARIN SODIUM 100 MG/ML
40 INJECTION SUBCUTANEOUS
Qty: 28 | Refills: 0
Start: 2023-12-15 | End: 2024-01-11

## 2023-12-15 RX ORDER — ENOXAPARIN SODIUM 100 MG/ML
40 INJECTION SUBCUTANEOUS EVERY 24 HOURS
Refills: 0 | Status: DISCONTINUED | OUTPATIENT
Start: 2023-12-15 | End: 2023-12-17

## 2023-12-15 RX ORDER — IBUPROFEN 200 MG
600 TABLET ORAL EVERY 6 HOURS
Refills: 0 | Status: DISCONTINUED | OUTPATIENT
Start: 2023-12-15 | End: 2023-12-17

## 2023-12-15 RX ORDER — ACETAMINOPHEN 500 MG
650 TABLET ORAL EVERY 6 HOURS
Refills: 0 | Status: DISCONTINUED | OUTPATIENT
Start: 2023-12-15 | End: 2023-12-17

## 2023-12-15 RX ADMIN — OXYCODONE HYDROCHLORIDE 10 MILLIGRAM(S): 5 TABLET ORAL at 00:21

## 2023-12-15 RX ADMIN — Medication 15 MILLIGRAM(S): at 14:37

## 2023-12-15 RX ADMIN — Medication 650 MILLIGRAM(S): at 22:20

## 2023-12-15 RX ADMIN — Medication 15 MILLIGRAM(S): at 05:19

## 2023-12-15 RX ADMIN — OXYCODONE HYDROCHLORIDE 10 MILLIGRAM(S): 5 TABLET ORAL at 01:16

## 2023-12-15 RX ADMIN — Medication 650 MILLIGRAM(S): at 23:04

## 2023-12-15 RX ADMIN — OXYCODONE HYDROCHLORIDE 10 MILLIGRAM(S): 5 TABLET ORAL at 23:04

## 2023-12-15 RX ADMIN — Medication 1000 MILLIGRAM(S): at 12:14

## 2023-12-15 RX ADMIN — OXYCODONE HYDROCHLORIDE 10 MILLIGRAM(S): 5 TABLET ORAL at 22:20

## 2023-12-15 RX ADMIN — LOSARTAN POTASSIUM 50 MILLIGRAM(S): 100 TABLET, FILM COATED ORAL at 05:19

## 2023-12-15 RX ADMIN — SODIUM CHLORIDE 125 MILLILITER(S): 9 INJECTION, SOLUTION INTRAVENOUS at 02:28

## 2023-12-15 RX ADMIN — OXYCODONE HYDROCHLORIDE 10 MILLIGRAM(S): 5 TABLET ORAL at 09:59

## 2023-12-15 RX ADMIN — OXYCODONE HYDROCHLORIDE 10 MILLIGRAM(S): 5 TABLET ORAL at 11:00

## 2023-12-15 RX ADMIN — Medication 1000 MILLIGRAM(S): at 05:19

## 2023-12-15 RX ADMIN — ENOXAPARIN SODIUM 40 MILLIGRAM(S): 100 INJECTION SUBCUTANEOUS at 14:37

## 2023-12-15 RX ADMIN — Medication 600 MILLIGRAM(S): at 17:32

## 2023-12-15 NOTE — PROVIDER CONTACT NOTE (OTHER) - NAME OF MD/NP/PA/DO NOTIFIED:
Emilio Gaines MD via [employer] [phone] Emilio Gaines MD via [employer] [phone]

## 2023-12-15 NOTE — PROGRESS NOTE ADULT - ASSESSMENT
62yo POD1 s/p dx l/s, ex-lap, JASMYN, BSO, omentectomy, removal of diaphragmatic nodule and repair of defect by CT surgery and surg/onc.     Neuro: tylenoll/toradol ATC, oxy 5/10 PRN  Pulm: RA  CV: VSS, HTN, home losartan 50qd  GI: CLD, Heplock; zofran/reglan PRN, simethicone ATC  Transaminitis   : s/p vega  VTE: SCDs    [] AM CXR  [] AM labs  [] f/u TOV  [] f/u CT surgery and surgical oncology recommendations  [] Lovenox after AM CBC    64yo POD1 s/p dx l/s, ex-lap, JASMYN, BSO, omentectomy, removal of diaphragmatic nodule and repair of defect by CT surgery and surg/onc.     Neuro: tylenoll/toradol ATC, oxy 5/10 PRN  Pulm: RA  CV: VSS, HTN, home losartan 50qd  GI: CLD, Heplock; zofran/reglan PRN, simethicone ATC  Transaminitis   : s/p vega  VTE: SCDs    [] AM CXR  [] AM labs  [] f/u TOV  [] f/u CT surgery and surgical oncology recommendations  [] Lovenox after AM CBC

## 2023-12-16 LAB
ALBUMIN SERPL ELPH-MCNC: 3.5 G/DL — SIGNIFICANT CHANGE UP (ref 3.3–5)
ALBUMIN SERPL ELPH-MCNC: 3.5 G/DL — SIGNIFICANT CHANGE UP (ref 3.3–5)
ALP SERPL-CCNC: 63 U/L — SIGNIFICANT CHANGE UP (ref 40–120)
ALP SERPL-CCNC: 63 U/L — SIGNIFICANT CHANGE UP (ref 40–120)
ALT FLD-CCNC: 105 U/L — HIGH (ref 10–45)
ALT FLD-CCNC: 105 U/L — HIGH (ref 10–45)
ANION GAP SERPL CALC-SCNC: 8 MMOL/L — SIGNIFICANT CHANGE UP (ref 5–17)
ANION GAP SERPL CALC-SCNC: 8 MMOL/L — SIGNIFICANT CHANGE UP (ref 5–17)
AST SERPL-CCNC: 79 U/L — HIGH (ref 10–40)
AST SERPL-CCNC: 79 U/L — HIGH (ref 10–40)
BILIRUB SERPL-MCNC: 0.7 MG/DL — SIGNIFICANT CHANGE UP (ref 0.2–1.2)
BILIRUB SERPL-MCNC: 0.7 MG/DL — SIGNIFICANT CHANGE UP (ref 0.2–1.2)
BUN SERPL-MCNC: 14 MG/DL — SIGNIFICANT CHANGE UP (ref 7–23)
BUN SERPL-MCNC: 14 MG/DL — SIGNIFICANT CHANGE UP (ref 7–23)
CALCIUM SERPL-MCNC: 8.8 MG/DL — SIGNIFICANT CHANGE UP (ref 8.4–10.5)
CALCIUM SERPL-MCNC: 8.8 MG/DL — SIGNIFICANT CHANGE UP (ref 8.4–10.5)
CHLORIDE SERPL-SCNC: 103 MMOL/L — SIGNIFICANT CHANGE UP (ref 96–108)
CHLORIDE SERPL-SCNC: 103 MMOL/L — SIGNIFICANT CHANGE UP (ref 96–108)
CO2 SERPL-SCNC: 27 MMOL/L — SIGNIFICANT CHANGE UP (ref 22–31)
CO2 SERPL-SCNC: 27 MMOL/L — SIGNIFICANT CHANGE UP (ref 22–31)
CREAT SERPL-MCNC: 0.74 MG/DL — SIGNIFICANT CHANGE UP (ref 0.5–1.3)
CREAT SERPL-MCNC: 0.74 MG/DL — SIGNIFICANT CHANGE UP (ref 0.5–1.3)
EGFR: 92 ML/MIN/1.73M2 — SIGNIFICANT CHANGE UP
EGFR: 92 ML/MIN/1.73M2 — SIGNIFICANT CHANGE UP
GLUCOSE SERPL-MCNC: 99 MG/DL — SIGNIFICANT CHANGE UP (ref 70–99)
GLUCOSE SERPL-MCNC: 99 MG/DL — SIGNIFICANT CHANGE UP (ref 70–99)
HCT VFR BLD CALC: 33.7 % — LOW (ref 34.5–45)
HCT VFR BLD CALC: 33.7 % — LOW (ref 34.5–45)
HGB BLD-MCNC: 11.1 G/DL — LOW (ref 11.5–15.5)
HGB BLD-MCNC: 11.1 G/DL — LOW (ref 11.5–15.5)
MAGNESIUM SERPL-MCNC: 2 MG/DL — SIGNIFICANT CHANGE UP (ref 1.6–2.6)
MAGNESIUM SERPL-MCNC: 2 MG/DL — SIGNIFICANT CHANGE UP (ref 1.6–2.6)
MCHC RBC-ENTMCNC: 29.8 PG — SIGNIFICANT CHANGE UP (ref 27–34)
MCHC RBC-ENTMCNC: 29.8 PG — SIGNIFICANT CHANGE UP (ref 27–34)
MCHC RBC-ENTMCNC: 32.9 GM/DL — SIGNIFICANT CHANGE UP (ref 32–36)
MCHC RBC-ENTMCNC: 32.9 GM/DL — SIGNIFICANT CHANGE UP (ref 32–36)
MCV RBC AUTO: 90.6 FL — SIGNIFICANT CHANGE UP (ref 80–100)
MCV RBC AUTO: 90.6 FL — SIGNIFICANT CHANGE UP (ref 80–100)
NRBC # BLD: 0 /100 WBCS — SIGNIFICANT CHANGE UP (ref 0–0)
NRBC # BLD: 0 /100 WBCS — SIGNIFICANT CHANGE UP (ref 0–0)
PHOSPHATE SERPL-MCNC: 2.2 MG/DL — LOW (ref 2.5–4.5)
PHOSPHATE SERPL-MCNC: 2.2 MG/DL — LOW (ref 2.5–4.5)
PLATELET # BLD AUTO: 238 K/UL — SIGNIFICANT CHANGE UP (ref 150–400)
PLATELET # BLD AUTO: 238 K/UL — SIGNIFICANT CHANGE UP (ref 150–400)
POTASSIUM SERPL-MCNC: 3.8 MMOL/L — SIGNIFICANT CHANGE UP (ref 3.5–5.3)
POTASSIUM SERPL-MCNC: 3.8 MMOL/L — SIGNIFICANT CHANGE UP (ref 3.5–5.3)
POTASSIUM SERPL-SCNC: 3.8 MMOL/L — SIGNIFICANT CHANGE UP (ref 3.5–5.3)
POTASSIUM SERPL-SCNC: 3.8 MMOL/L — SIGNIFICANT CHANGE UP (ref 3.5–5.3)
PROT SERPL-MCNC: 5.9 G/DL — LOW (ref 6–8.3)
PROT SERPL-MCNC: 5.9 G/DL — LOW (ref 6–8.3)
RBC # BLD: 3.72 M/UL — LOW (ref 3.8–5.2)
RBC # BLD: 3.72 M/UL — LOW (ref 3.8–5.2)
RBC # FLD: 13.5 % — SIGNIFICANT CHANGE UP (ref 10.3–14.5)
RBC # FLD: 13.5 % — SIGNIFICANT CHANGE UP (ref 10.3–14.5)
SODIUM SERPL-SCNC: 138 MMOL/L — SIGNIFICANT CHANGE UP (ref 135–145)
SODIUM SERPL-SCNC: 138 MMOL/L — SIGNIFICANT CHANGE UP (ref 135–145)
WBC # BLD: 10.57 K/UL — HIGH (ref 3.8–10.5)
WBC # BLD: 10.57 K/UL — HIGH (ref 3.8–10.5)
WBC # FLD AUTO: 10.57 K/UL — HIGH (ref 3.8–10.5)
WBC # FLD AUTO: 10.57 K/UL — HIGH (ref 3.8–10.5)

## 2023-12-16 RX ORDER — MAGNESIUM SULFATE 500 MG/ML
2 VIAL (ML) INJECTION ONCE
Refills: 0 | Status: COMPLETED | OUTPATIENT
Start: 2023-12-16 | End: 2023-12-16

## 2023-12-16 RX ORDER — FAMOTIDINE 10 MG/ML
20 INJECTION INTRAVENOUS ONCE
Refills: 0 | Status: COMPLETED | OUTPATIENT
Start: 2023-12-16 | End: 2023-12-16

## 2023-12-16 RX ORDER — POTASSIUM CHLORIDE 20 MEQ
20 PACKET (EA) ORAL ONCE
Refills: 0 | Status: COMPLETED | OUTPATIENT
Start: 2023-12-16 | End: 2023-12-16

## 2023-12-16 RX ORDER — POTASSIUM PHOSPHATE, MONOBASIC POTASSIUM PHOSPHATE, DIBASIC 236; 224 MG/ML; MG/ML
15 INJECTION, SOLUTION INTRAVENOUS ONCE
Refills: 0 | Status: COMPLETED | OUTPATIENT
Start: 2023-12-16 | End: 2023-12-16

## 2023-12-16 RX ORDER — CALCIUM CARBONATE 500(1250)
1 TABLET ORAL ONCE
Refills: 0 | Status: COMPLETED | OUTPATIENT
Start: 2023-12-16 | End: 2023-12-16

## 2023-12-16 RX ADMIN — Medication 600 MILLIGRAM(S): at 18:43

## 2023-12-16 RX ADMIN — LOSARTAN POTASSIUM 50 MILLIGRAM(S): 100 TABLET, FILM COATED ORAL at 05:03

## 2023-12-16 RX ADMIN — Medication 650 MILLIGRAM(S): at 15:12

## 2023-12-16 RX ADMIN — FAMOTIDINE 20 MILLIGRAM(S): 10 INJECTION INTRAVENOUS at 10:26

## 2023-12-16 RX ADMIN — ENOXAPARIN SODIUM 40 MILLIGRAM(S): 100 INJECTION SUBCUTANEOUS at 15:12

## 2023-12-16 RX ADMIN — SIMETHICONE 80 MILLIGRAM(S): 80 TABLET, CHEWABLE ORAL at 08:05

## 2023-12-16 RX ADMIN — Medication 600 MILLIGRAM(S): at 00:16

## 2023-12-16 RX ADMIN — Medication 1 TABLET(S): at 10:26

## 2023-12-16 RX ADMIN — Medication 600 MILLIGRAM(S): at 05:03

## 2023-12-16 RX ADMIN — OXYCODONE HYDROCHLORIDE 10 MILLIGRAM(S): 5 TABLET ORAL at 07:24

## 2023-12-16 RX ADMIN — Medication 600 MILLIGRAM(S): at 06:00

## 2023-12-16 RX ADMIN — Medication 600 MILLIGRAM(S): at 23:58

## 2023-12-16 RX ADMIN — Medication 650 MILLIGRAM(S): at 06:00

## 2023-12-16 RX ADMIN — Medication 650 MILLIGRAM(S): at 05:03

## 2023-12-16 RX ADMIN — OXYCODONE HYDROCHLORIDE 10 MILLIGRAM(S): 5 TABLET ORAL at 08:00

## 2023-12-16 RX ADMIN — Medication 25 GRAM(S): at 10:56

## 2023-12-16 RX ADMIN — POTASSIUM PHOSPHATE, MONOBASIC POTASSIUM PHOSPHATE, DIBASIC 62.5 MILLIMOLE(S): 236; 224 INJECTION, SOLUTION INTRAVENOUS at 15:12

## 2023-12-16 RX ADMIN — Medication 20 MILLIEQUIVALENT(S): at 10:55

## 2023-12-16 RX ADMIN — Medication 650 MILLIGRAM(S): at 10:26

## 2023-12-16 RX ADMIN — Medication 650 MILLIGRAM(S): at 22:37

## 2023-12-16 RX ADMIN — Medication 600 MILLIGRAM(S): at 13:06

## 2023-12-16 NOTE — CHART NOTE - NSCHARTNOTEFT_GEN_A_CORE
Patient discussed with Dr. Cowan.  Patient s/p resection of diaphragmatic nodules    Post Op CXR reviewed.  No acute pathology. Continue care per primary team.

## 2023-12-16 NOTE — PROGRESS NOTE ADULT - ASSESSMENT
62yo P3 s/p dx l/s, ex-lap, JASMYN, BSO, omentectomy, removal of diaphragmatic nodule and repair of defect by CT surgery and surg/onc. 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

## 2023-12-16 NOTE — PROVIDER CONTACT NOTE (OTHER) - ACTION/TREATMENT ORDERED:
Oyxcodone 10mg po given for pain. No additional interventions.
Oxycodone 10mg po given from pain . GYN residents came to see pt and suggested for her to ambulate perhaps to relieve possible gas pain. Pt ambulated in hallway with son. Simethicone 80mg also given.

## 2023-12-16 NOTE — PROVIDER CONTACT NOTE (OTHER) - ASSESSMENT
Pt's systolic BP has been trending 160s. Pt denied headache or chest pain. Pt stated pain level 7/10 in abdomen. Losartan 50mg po and Hydralazine 5mg IVP was last given at 1833.
Pt stated she felt pain on her left chest and under left breast and thought it could be from surgical site in diaphragm. Pt couldn't describe the pain but it started a few minutes ago. Pt also stated "I just want to press or support the pain site." Pt couldn't determine whether pain was muscular or cardiac related. /79 HR 88 O2 sat 95% in RA. Pt is off telemetry monitoring as per order.

## 2023-12-17 ENCOUNTER — TRANSCRIPTION ENCOUNTER (OUTPATIENT)
Age: 63
End: 2023-12-17

## 2023-12-17 VITALS
HEART RATE: 97 BPM | DIASTOLIC BLOOD PRESSURE: 92 MMHG | SYSTOLIC BLOOD PRESSURE: 156 MMHG | OXYGEN SATURATION: 95 % | RESPIRATION RATE: 18 BRPM | TEMPERATURE: 98 F

## 2023-12-17 LAB
ANION GAP SERPL CALC-SCNC: 8 MMOL/L — SIGNIFICANT CHANGE UP (ref 5–17)
ANION GAP SERPL CALC-SCNC: 8 MMOL/L — SIGNIFICANT CHANGE UP (ref 5–17)
BASOPHILS # BLD AUTO: 0.06 K/UL — SIGNIFICANT CHANGE UP (ref 0–0.2)
BASOPHILS # BLD AUTO: 0.06 K/UL — SIGNIFICANT CHANGE UP (ref 0–0.2)
BASOPHILS NFR BLD AUTO: 0.6 % — SIGNIFICANT CHANGE UP (ref 0–2)
BASOPHILS NFR BLD AUTO: 0.6 % — SIGNIFICANT CHANGE UP (ref 0–2)
BUN SERPL-MCNC: 13 MG/DL — SIGNIFICANT CHANGE UP (ref 7–23)
BUN SERPL-MCNC: 13 MG/DL — SIGNIFICANT CHANGE UP (ref 7–23)
CALCIUM SERPL-MCNC: 8.7 MG/DL — SIGNIFICANT CHANGE UP (ref 8.4–10.5)
CALCIUM SERPL-MCNC: 8.7 MG/DL — SIGNIFICANT CHANGE UP (ref 8.4–10.5)
CHLORIDE SERPL-SCNC: 104 MMOL/L — SIGNIFICANT CHANGE UP (ref 96–108)
CHLORIDE SERPL-SCNC: 104 MMOL/L — SIGNIFICANT CHANGE UP (ref 96–108)
CO2 SERPL-SCNC: 26 MMOL/L — SIGNIFICANT CHANGE UP (ref 22–31)
CO2 SERPL-SCNC: 26 MMOL/L — SIGNIFICANT CHANGE UP (ref 22–31)
CREAT SERPL-MCNC: 0.69 MG/DL — SIGNIFICANT CHANGE UP (ref 0.5–1.3)
CREAT SERPL-MCNC: 0.69 MG/DL — SIGNIFICANT CHANGE UP (ref 0.5–1.3)
EGFR: 99 ML/MIN/1.73M2 — SIGNIFICANT CHANGE UP
EGFR: 99 ML/MIN/1.73M2 — SIGNIFICANT CHANGE UP
EOSINOPHIL # BLD AUTO: 0.13 K/UL — SIGNIFICANT CHANGE UP (ref 0–0.5)
EOSINOPHIL # BLD AUTO: 0.13 K/UL — SIGNIFICANT CHANGE UP (ref 0–0.5)
EOSINOPHIL NFR BLD AUTO: 1.3 % — SIGNIFICANT CHANGE UP (ref 0–6)
EOSINOPHIL NFR BLD AUTO: 1.3 % — SIGNIFICANT CHANGE UP (ref 0–6)
GLUCOSE SERPL-MCNC: 107 MG/DL — HIGH (ref 70–99)
GLUCOSE SERPL-MCNC: 107 MG/DL — HIGH (ref 70–99)
HCT VFR BLD CALC: 33.1 % — LOW (ref 34.5–45)
HCT VFR BLD CALC: 33.1 % — LOW (ref 34.5–45)
HGB BLD-MCNC: 10.6 G/DL — LOW (ref 11.5–15.5)
HGB BLD-MCNC: 10.6 G/DL — LOW (ref 11.5–15.5)
IMM GRANULOCYTES NFR BLD AUTO: 0.9 % — SIGNIFICANT CHANGE UP (ref 0–0.9)
IMM GRANULOCYTES NFR BLD AUTO: 0.9 % — SIGNIFICANT CHANGE UP (ref 0–0.9)
LYMPHOCYTES # BLD AUTO: 1.19 K/UL — SIGNIFICANT CHANGE UP (ref 1–3.3)
LYMPHOCYTES # BLD AUTO: 1.19 K/UL — SIGNIFICANT CHANGE UP (ref 1–3.3)
LYMPHOCYTES # BLD AUTO: 12.3 % — LOW (ref 13–44)
LYMPHOCYTES # BLD AUTO: 12.3 % — LOW (ref 13–44)
MAGNESIUM SERPL-MCNC: 1.9 MG/DL — SIGNIFICANT CHANGE UP (ref 1.6–2.6)
MAGNESIUM SERPL-MCNC: 1.9 MG/DL — SIGNIFICANT CHANGE UP (ref 1.6–2.6)
MCHC RBC-ENTMCNC: 29 PG — SIGNIFICANT CHANGE UP (ref 27–34)
MCHC RBC-ENTMCNC: 29 PG — SIGNIFICANT CHANGE UP (ref 27–34)
MCHC RBC-ENTMCNC: 32 GM/DL — SIGNIFICANT CHANGE UP (ref 32–36)
MCHC RBC-ENTMCNC: 32 GM/DL — SIGNIFICANT CHANGE UP (ref 32–36)
MCV RBC AUTO: 90.4 FL — SIGNIFICANT CHANGE UP (ref 80–100)
MCV RBC AUTO: 90.4 FL — SIGNIFICANT CHANGE UP (ref 80–100)
MONOCYTES # BLD AUTO: 0.85 K/UL — SIGNIFICANT CHANGE UP (ref 0–0.9)
MONOCYTES # BLD AUTO: 0.85 K/UL — SIGNIFICANT CHANGE UP (ref 0–0.9)
MONOCYTES NFR BLD AUTO: 8.8 % — SIGNIFICANT CHANGE UP (ref 2–14)
MONOCYTES NFR BLD AUTO: 8.8 % — SIGNIFICANT CHANGE UP (ref 2–14)
NEUTROPHILS # BLD AUTO: 7.32 K/UL — SIGNIFICANT CHANGE UP (ref 1.8–7.4)
NEUTROPHILS # BLD AUTO: 7.32 K/UL — SIGNIFICANT CHANGE UP (ref 1.8–7.4)
NEUTROPHILS NFR BLD AUTO: 76.1 % — SIGNIFICANT CHANGE UP (ref 43–77)
NEUTROPHILS NFR BLD AUTO: 76.1 % — SIGNIFICANT CHANGE UP (ref 43–77)
NRBC # BLD: 0 /100 WBCS — SIGNIFICANT CHANGE UP (ref 0–0)
NRBC # BLD: 0 /100 WBCS — SIGNIFICANT CHANGE UP (ref 0–0)
PHOSPHATE SERPL-MCNC: 2.8 MG/DL — SIGNIFICANT CHANGE UP (ref 2.5–4.5)
PHOSPHATE SERPL-MCNC: 2.8 MG/DL — SIGNIFICANT CHANGE UP (ref 2.5–4.5)
PLATELET # BLD AUTO: 240 K/UL — SIGNIFICANT CHANGE UP (ref 150–400)
PLATELET # BLD AUTO: 240 K/UL — SIGNIFICANT CHANGE UP (ref 150–400)
POTASSIUM SERPL-MCNC: 4 MMOL/L — SIGNIFICANT CHANGE UP (ref 3.5–5.3)
POTASSIUM SERPL-MCNC: 4 MMOL/L — SIGNIFICANT CHANGE UP (ref 3.5–5.3)
POTASSIUM SERPL-SCNC: 4 MMOL/L — SIGNIFICANT CHANGE UP (ref 3.5–5.3)
POTASSIUM SERPL-SCNC: 4 MMOL/L — SIGNIFICANT CHANGE UP (ref 3.5–5.3)
RBC # BLD: 3.66 M/UL — LOW (ref 3.8–5.2)
RBC # BLD: 3.66 M/UL — LOW (ref 3.8–5.2)
RBC # FLD: 13.2 % — SIGNIFICANT CHANGE UP (ref 10.3–14.5)
RBC # FLD: 13.2 % — SIGNIFICANT CHANGE UP (ref 10.3–14.5)
SODIUM SERPL-SCNC: 138 MMOL/L — SIGNIFICANT CHANGE UP (ref 135–145)
SODIUM SERPL-SCNC: 138 MMOL/L — SIGNIFICANT CHANGE UP (ref 135–145)
WBC # BLD: 9.64 K/UL — SIGNIFICANT CHANGE UP (ref 3.8–10.5)
WBC # BLD: 9.64 K/UL — SIGNIFICANT CHANGE UP (ref 3.8–10.5)
WBC # FLD AUTO: 9.64 K/UL — SIGNIFICANT CHANGE UP (ref 3.8–10.5)
WBC # FLD AUTO: 9.64 K/UL — SIGNIFICANT CHANGE UP (ref 3.8–10.5)

## 2023-12-17 PROCEDURE — 85027 COMPLETE CBC AUTOMATED: CPT

## 2023-12-17 PROCEDURE — 80048 BASIC METABOLIC PNL TOTAL CA: CPT

## 2023-12-17 PROCEDURE — 88305 TISSUE EXAM BY PATHOLOGIST: CPT

## 2023-12-17 PROCEDURE — 36415 COLL VENOUS BLD VENIPUNCTURE: CPT

## 2023-12-17 PROCEDURE — 71045 X-RAY EXAM CHEST 1 VIEW: CPT

## 2023-12-17 PROCEDURE — 83735 ASSAY OF MAGNESIUM: CPT

## 2023-12-17 PROCEDURE — 80053 COMPREHEN METABOLIC PANEL: CPT

## 2023-12-17 PROCEDURE — 88307 TISSUE EXAM BY PATHOLOGIST: CPT

## 2023-12-17 PROCEDURE — 84100 ASSAY OF PHOSPHORUS: CPT

## 2023-12-17 PROCEDURE — 88331 PATH CONSLTJ SURG 1 BLK 1SPC: CPT

## 2023-12-17 PROCEDURE — 88304 TISSUE EXAM BY PATHOLOGIST: CPT

## 2023-12-17 PROCEDURE — 86803 HEPATITIS C AB TEST: CPT

## 2023-12-17 PROCEDURE — 86900 BLOOD TYPING SEROLOGIC ABO: CPT

## 2023-12-17 PROCEDURE — 86901 BLOOD TYPING SEROLOGIC RH(D): CPT

## 2023-12-17 PROCEDURE — 85025 COMPLETE CBC W/AUTO DIFF WBC: CPT

## 2023-12-17 PROCEDURE — 86850 RBC ANTIBODY SCREEN: CPT

## 2023-12-17 PROCEDURE — 82962 GLUCOSE BLOOD TEST: CPT

## 2023-12-17 PROCEDURE — 88309 TISSUE EXAM BY PATHOLOGIST: CPT

## 2023-12-17 RX ORDER — SODIUM,POTASSIUM PHOSPHATES 278-250MG
1 POWDER IN PACKET (EA) ORAL ONCE
Refills: 0 | Status: COMPLETED | OUTPATIENT
Start: 2023-12-17 | End: 2023-12-17

## 2023-12-17 RX ORDER — IBUPROFEN 200 MG
1 TABLET ORAL
Qty: 0 | Refills: 0 | DISCHARGE
Start: 2023-12-17

## 2023-12-17 RX ORDER — MAGNESIUM SULFATE 500 MG/ML
2 VIAL (ML) INJECTION ONCE
Refills: 0 | Status: COMPLETED | OUTPATIENT
Start: 2023-12-17 | End: 2023-12-17

## 2023-12-17 RX ORDER — ACETAMINOPHEN 500 MG
2 TABLET ORAL
Qty: 0 | Refills: 0 | DISCHARGE
Start: 2023-12-17

## 2023-12-17 RX ADMIN — LOSARTAN POTASSIUM 50 MILLIGRAM(S): 100 TABLET, FILM COATED ORAL at 05:40

## 2023-12-17 RX ADMIN — Medication 650 MILLIGRAM(S): at 09:31

## 2023-12-17 RX ADMIN — ENOXAPARIN SODIUM 40 MILLIGRAM(S): 100 INJECTION SUBCUTANEOUS at 13:51

## 2023-12-17 RX ADMIN — Medication 650 MILLIGRAM(S): at 03:28

## 2023-12-17 RX ADMIN — Medication 25 GRAM(S): at 09:31

## 2023-12-17 RX ADMIN — Medication 600 MILLIGRAM(S): at 05:39

## 2023-12-17 RX ADMIN — Medication 1 TABLET(S): at 12:37

## 2023-12-17 RX ADMIN — Medication 600 MILLIGRAM(S): at 12:39

## 2023-12-17 NOTE — DISCHARGE NOTE NURSING/CASE MANAGEMENT/SOCIAL WORK - NSDCPEFALRISK_GEN_ALL_CORE
For information on Fall & Injury Prevention, visit: [email or web address] OR  [email or web address] OR  https://www.cdc.gov/steadi/patient.html For information on Fall & Injury Prevention, visit: [email or web address] OR  [email or web address] OR  https://www.cdc.gov/steadi/patient.html

## 2023-12-17 NOTE — PROGRESS NOTE ADULT - ASSESSMENT
64yo P3 s/p dx l/s, ex-lap, JASMYN, BSO, omentectomy, removal of diaphragmatic nodule and repair of defect by CT surgery and surg/onc. 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

## 2023-12-17 NOTE — PROGRESS NOTE ADULT - SUBJECTIVE AND OBJECTIVE BOX
Pt seen and examined at bedside. Pt feels well. Abdominal pain well controlled. Ambulating and voiding without difficulty. Reports blood in urine yesterday, now resolved entirely. Denies pain with urination.  Passing flatus, had liquid bowel movement.  Denies chest pain/SOB.    T(F): 98.6 (12-17-23 @ 04:50), Max: 98.6 (12-16-23 @ 17:14)  HR: 85 (12-17-23 @ 04:50) (76 - 90)  BP: 161/82 (12-17-23 @ 04:50) (118/73 - 161/82)  RR: 18 (12-17-23 @ 04:50) (17 - 19)  SpO2: 94% (12-17-23 @ 04:50) (94% - 98%)  Wt(kg): --  I&O's Summary    16 Dec 2023 07:01  -  17 Dec 2023 07:00  --------------------------------------------------------  IN: 0 mL / OUT: 450 mL / NET: -450 mL        MEDICATIONS  (STANDING):  acetaminophen     Tablet .. 650 milliGRAM(s) Oral every 6 hours  enoxaparin Injectable 40 milliGRAM(s) SubCutaneous every 24 hours  ibuprofen  Tablet. 600 milliGRAM(s) Oral every 6 hours  losartan 50 milliGRAM(s) Oral daily    MEDICATIONS  (PRN):  metoclopramide Injectable 10 milliGRAM(s) IV Push every 8 hours PRN Nausea and/or Vomiting  ondansetron Injectable 8 milliGRAM(s) IV Push every 8 hours PRN Nausea and/or Vomiting  oxyCODONE    IR 5 milliGRAM(s) Oral every 4 hours PRN Moderate Pain (4 - 6)  oxyCODONE    IR 10 milliGRAM(s) Oral every 4 hours PRN Severe Pain (7 - 10)  simethicone 80 milliGRAM(s) Chew every 6 hours PRN Gas      Physical Exam:  Constitutional: NAD  Pulmonary: no increased work of breathing  Abdomen: Incision clean/dry/intact. Appropriately tender, soft. No distension, rebound, or guarding. +Bowel sounds bilaterally.  Ext: No calf tenderness or edema    LABS:                        10.6   9.64  )-----------( 240      ( 17 Dec 2023 07:11 )             33.1     12-17    138  |  104  |  13  ----------------------------<  107<H>  4.0   |  26  |  0.69    Ca    8.7      17 Dec 2023 07:11  Phos  2.8     12-17  Mg     1.9     12-17    TPro  5.9<L>  /  Alb  3.5  /  TBili  0.7  /  DBili  x   /  AST  79<H>  /  ALT  105<H>  /  AlkPhos  63  12-16      Urinalysis Basic - ( 17 Dec 2023 07:11 )    Color: x / Appearance: x / SG: x / pH: x  Gluc: 107 mg/dL / Ketone: x  / Bili: x / Urobili: x   Blood: x / Protein: x / Nitrite: x   Leuk Esterase: x / RBC: x / WBC x   Sq Epi: x / Non Sq Epi: x / Bacteria: x        
Pt seen and examined at bedside. Pt complains of mild abdominal pain.   Pt denies any fever, chills, chest pain, SOB, nausea or vomiting     T(F): 97.8 (12-14-23 @ 20:55), Max: 97.8 (12-14-23 @ 20:55)  HR: 95 (12-14-23 @ 20:55) (87 - 105)  BP: 162/74 (12-14-23 @ 20:55) (162/74 - 189/98)  RR: 18 (12-14-23 @ 20:55) (12 - 23)  SpO2: 94% (12-14-23 @ 20:55) (94% - 100%)  Wt(kg): --    12-14 @ 07:01  -  12-14 @ 23:18  --------------------------------------------------------  IN: 520 mL / OUT: 1450 mL / NET: -930 mL        acetaminophen     Tablet .. 1000 milliGRAM(s) Oral every 6 hours  ketorolac   Injectable 15 milliGRAM(s) IV Push every 8 hours  lactated ringers. 1000 milliLiter(s) IV Continuous <Continuous>  losartan 50 milliGRAM(s) Oral daily  metoclopramide Injectable 10 milliGRAM(s) IV Push every 8 hours PRN Nausea and/or Vomiting  ondansetron Injectable 8 milliGRAM(s) IV Push every 8 hours PRN Nausea and/or Vomiting  oxyCODONE    IR 5 milliGRAM(s) Oral every 4 hours PRN Moderate Pain (4 - 6)  oxyCODONE    IR 10 milliGRAM(s) Oral every 4 hours PRN Severe Pain (7 - 10)  simethicone 80 milliGRAM(s) Chew every 6 hours PRN Gas      Physical exam:  Constitutional: NAD  Abdomen: incision sites clean, dry and intact. Soft, mildly tender, nondistended  Extremities: no lower extremity edema, or calve tenderness. SCDs in place    A:   62yo s/p  l/s, ex-lap, JASMYN, BSO, omentectomy, removal of diaphragmatic nodule and repair of defect by CT surgery and surg/onc. Meeting postop milestones appropriately.     Neuro: tylenoll/toradol ATC, oxy 5/10 PRN  Pulm: RA  CV: VSS, HTN, home losartan 50qd  GI: CLD, LR@125; zofran/reglan PRN, simethicone ATC, -/-  Transaminitis   : vega  VTE: SCDs    [] AM LABS (ordered 12/15)   [] f/u STAT CR final read - No definite pneumothorax. Hazy right lower lung zone opacity/effusion. (prelim read).  [] Lovenox 12/15 AM   [] CXR ordered for 12/15 AM    [] CT SURG recs f/u (Inra)   [] Surg onc recs f/u (Cecilia)   [] vega out in AM  
Patient evaluated at the bedside. No acute events. PT endorses some discomfort at the left rib, improved with pressure and palpation  Denies CP/SOB/dizziness/nausea/vomiting/abdominal pain/calf pain.  Pain well controlled on oral pain medications. Patient is ambulating independently,tolerating diet, but has not passed flatus.    O:   T(C): 36.3 (12-16-23 @ 10:33), Max: 37.1 (12-15-23 @ 17:15)  HR: 83 (12-16-23 @ 10:33) (70 - 97)  BP: 118/73 (12-16-23 @ 10:33) (118/73 - 147/65)  RR: 18 (12-16-23 @ 10:33) (16 - 19)  SpO2: 94% (12-16-23 @ 10:33) (94% - 97%)  Wt(kg): --    GEN: patient appears well  LUNGS: no respiratory distress  ABD: soft, appropriately tender, non distended, incision clean and dry  EXT: no calf tenderness      12-15 @ 07:01  -  12-16 @ 07:00  --------------------------------------------------------  IN: 0 mL / OUT: 1500 mL / NET: -1500 mL      WBC Count: 10.57 K/uL *H* (12-16-23 @ 07:50)  Hemoglobin: 11.1 g/dL *L* (12-16-23 @ 07:50)  Creatinine: 0.74 mg/dL (12-16-23 @ 07:50)        
Pt seen and examined at bedside. Pt states mild abdominal pain. Pt [not yet] ambulating, tolerating CL diet, [no] flatus,  [x] urinating adequately.   Pt denies fever, chills, chest pain, SOB, nausea, vomiting, lightheadedness, dizziness.      T(F): 98.5 (12-15-23 @ 05:17), Max: 99.5 (12-15-23 @ 00:18)  HR: 87 (12-15-23 @ 05:17) (87 - 105)  BP: 149/78 (12-15-23 @ 05:17) (149/78 - 189/98)  RR: 18 (12-15-23 @ 05:17) (12 - 23)  SpO2: 97% (12-15-23 @ 05:17) (94% - 100%)  Wt(kg): --  I&O's Summary    14 Dec 2023 07:01  -  15 Dec 2023 07:00  --------------------------------------------------------  IN: 1645 mL / OUT: 2240 mL / NET: -595 mL        MEDICATIONS  (STANDING):  acetaminophen     Tablet .. 1000 milliGRAM(s) Oral every 6 hours  ketorolac   Injectable 15 milliGRAM(s) IV Push every 8 hours  losartan 50 milliGRAM(s) Oral daily    MEDICATIONS  (PRN):  metoclopramide Injectable 10 milliGRAM(s) IV Push every 8 hours PRN Nausea and/or Vomiting  ondansetron Injectable 8 milliGRAM(s) IV Push every 8 hours PRN Nausea and/or Vomiting  oxyCODONE    IR 5 milliGRAM(s) Oral every 4 hours PRN Moderate Pain (4 - 6)  oxyCODONE    IR 10 milliGRAM(s) Oral every 4 hours PRN Severe Pain (7 - 10)  simethicone 80 milliGRAM(s) Chew every 6 hours PRN Gas      Physical Exam:  Constitutional: NAD  Pulmonary: no increased work of breathing  Cardiovascular: Regular rate and rhythm   Abdomen: incision site clean, dry, intact with bandage in place. Soft, mildly tender, [mildly] distended, no guarding, no rebound, [+] bowel sounds  Extremities: no lower extremity edema or calf tenderness. SCDs in place     LABS:                        12.6   13.83 )-----------( 264      ( 15 Dec 2023 05:30 )             37.6     12-14    137  |  105  |  12  ----------------------------<  160<H>  3.8   |  21<L>  |  0.62    Ca    8.8      14 Dec 2023 16:32  Phos  3.6     12-14  Mg     1.6     12-14    TPro  6.5  /  Alb  3.9  /  TBili  0.3  /  DBili  x   /  AST  255<H>  /  ALT  223<H>  /  AlkPhos  80  12-14      Urinalysis Basic - ( 14 Dec 2023 16:32 )    Color: x / Appearance: x / SG: x / pH: x  Gluc: 160 mg/dL / Ketone: x  / Bili: x / Urobili: x   Blood: x / Protein: x / Nitrite: x   Leuk Esterase: x / RBC: x / WBC x   Sq Epi: x / Non Sq Epi: x / Bacteria: x        RADIOLOGY & ADDITIONAL TESTS:

## 2023-12-17 NOTE — DISCHARGE NOTE NURSING/CASE MANAGEMENT/SOCIAL WORK - PATIENT PORTAL LINK FT
You can access the FollowMyHealth Patient Portal offered by [facility] by registering at the following website: [email or web address]. By joining [employer]’s FollowMyHealth portal, you will also be able to view your health information using other applications (apps) compatible with our system. You can access the FollowMyHealth Patient Portal offered by [facility] by registering at the following website: [email or web address]. By joining [employer]’s FollowMyHealth portal, you will also be able to view your health information using other applications (apps) compatible with our system.

## 2023-12-18 PROBLEM — L30.9 DERMATITIS, UNSPECIFIED: Chronic | Status: ACTIVE | Noted: 2023-12-13

## 2023-12-18 PROBLEM — J30.2 OTHER SEASONAL ALLERGIC RHINITIS: Chronic | Status: ACTIVE | Noted: 2023-12-13

## 2023-12-19 PROBLEM — Z87.2 PERSONAL HISTORY OF DISEASES OF THE SKIN AND SUBCUTANEOUS TISSUE: Chronic | Status: ACTIVE | Noted: 2023-12-13

## 2023-12-19 PROBLEM — I10 ESSENTIAL (PRIMARY) HYPERTENSION: Chronic | Status: ACTIVE | Noted: 2023-12-13

## 2023-12-21 ENCOUNTER — APPOINTMENT (OUTPATIENT)
Dept: GYNECOLOGIC ONCOLOGY | Facility: CLINIC | Age: 63
End: 2023-12-21
Payer: COMMERCIAL

## 2023-12-21 ENCOUNTER — APPOINTMENT (OUTPATIENT)
Dept: GYNECOLOGIC ONCOLOGY | Facility: CLINIC | Age: 63
End: 2023-12-21

## 2023-12-21 ENCOUNTER — NON-APPOINTMENT (OUTPATIENT)
Age: 63
End: 2023-12-21

## 2023-12-21 PROCEDURE — 99024 POSTOP FOLLOW-UP VISIT: CPT

## 2023-12-21 NOTE — HISTORY OF PRESENT ILLNESS
[FreeTextEntry1] : Problem: 1) Peritoneal Carcinomatosis 2) elevated  609 (11/10/23) 3) genetic testing neg > 10 years ago.   Previous Therapies: 1) TVUS 10/30/23: Uterus: 7.8 cm x 4.3 cm x 4.4 cm. 3.0 cm fundal fibroid. 7.2 cm right fundal pedunculated fibroid on a stalk. Endometrium: 2 mm. Within normal limits. Trace fluid in the cavity Right ovary: 3.4 cm x 2.2 cm x 2.1 cm within normal limits Left ovary: Not seen 8.9 cm heterogeneous left adnexal mass of uncertain etiology Fluid: Trace free fluid. Impression: Enlarged multifibroid uterus including a 7.2 cm dominant right fundal pedunculated fibroid on a stalk. 8.9 cm heterogeneous left adnexal mass of uncertain etiology. Left ovary was not visualized. Pedunculated fibroid versus left adnexal mass is not excluded. Recommend MRI pelvis with and without contrast for further clarification.  2) CT 10/17/23: Lower chest: No significant abnormality Liver: Elongated right hepatic lobe 21 cm craniocaudad. No focal lesion Gallbladder: Numerous count oculi and lumen of normal sized gallbladder. Slight gallbladder wall thickening. No pericholecystic fluid Spleen: Normal in size and configuration Pancreas: Unremarkable Adrenals: No mass Kidneys/ureters: No calculi, mass, or hydronephrosis Bladder: Prominently fluid distended but otherwise grossly unremarkable Reproductive organs: Uterine fundus slightly to right of midline. Lobulated right superior para uterine mass measuring 7 x 5.5 x 7 cm. Bowel: Diverticuli in sigmoid colon with long segmental wall thickening of the upper sigmoid colon and prominent pericolic inflammatory change. No bowel obstruction. Appendix appears normal in caliber Peritoneum: Small amount of free fluid in the posterior pelvis. No free air Vessels: No aneurysm. Mild arthrosclerotic calcification. Retroperitoneum/lymph nodes: No lymphadenopathy Abdominal wall: No abnormal mass or fluid collection Bones: No fracture or focal destructive or blastic lesion Impression: Findings compatible with acute diverticulitis of the upper sigmoid colon. Cholelithiasis and slight gallbladder wall thickening. No pericholecystic fluid or biliary duct dilation A lobulated right superior periuterine mass measuring 7 x 5.5 x 7 cm may be a pedunculated myoma, with masses of other etiology not excluded. Follow-up pelvic ultrasound can be performed for further evaluation in this regard  3) MRI 11/29/23: FINDINGS: UTERUS: 8.6 x 3.7 x 3.5 cm. Fundal intramural/subserosal fibroid, 3.5 x 2.6 cm (401, 32) ENDOMETRIUM: Within normal limits. JUNCTIONAL ZONE: Within normal limits. RIGHT OVARY: 8.5 x 7.0 x 7.5 cm (TV, AP, CC) predominantly solid mass replacing the right ovary with cystic components. Dominant 4 cm cystic component contains mildly T1 hyperintense proteinaceous fluid. LEFT OVARY: 8.0 x 5.3 x 4.0 cm (AP, CC, TV) predominantly solid mass replacing the left ovary with small cystic components. ADNEXA: As above. BLADDER: Within normal limits. LYMPH NODES: No pelvic lymphadenopathy. VISUALIZED PORTIONS: ABDOMINAL ORGANS: Within normal limits. BOWEL: Within normal limits. PERITONEUM: Small volume ascites. Findings suspicious for peritoneal carcinomatosis, subtle peritoneal enhancement and at least one discrete peritoneal nodule identified, a 5 mm posterior pelvic nodule (301, 25). VESSELS: Within normal small fat-containing umbilical hernia. BONES: No aggressive osseous lesion. IMPRESSION: Bilateral ovarian masses and findings suspicious for peritoneal carcinomatosis with small volume ascites. In light of the elevated CA-125 levels, primary ovarian malignancy is most likely although differential would include ovarian metastases.  4) S/P Exlap JASMYN, BSO, OMx and resection of diaphragmatic lesions 12/14/23     a)

## 2023-12-21 NOTE — PHYSICAL EXAM
[Normal] : No focal neurologic defects observed [de-identified] : incision c/d/i [Fully active, able to carry on all pre-disease performance without restriction] : Status 0 - Fully active, able to carry on all pre-disease performance without restriction

## 2023-12-21 NOTE — REASON FOR VISIT
[Home] : at home, [unfilled] , at the time of the visit. [Medical Office: ([facility])___] : at the medical office located in  [Patient] : the patient [Self] : self [This encounter was initiated by telehealth (audio with video) and converted to telephone (audio only) due to technical difficulties.] : This encounter was initiated by telehealth (audio with video) and converted to telephone (audio only) due to technical difficulties. [FreeTextEntry1] : 1 Week Post-op   62yo s/p Exlap JASMYN, BSO, OMx and resection of diaphragmatic lesions here for 1 week post-op visit. Taking the Lovenox daily without issue. Pain controlled with Tylenol and Ibuprofen prn. +BMs. denies sob/cp  GYNHx; h/o 1 abnormal pap in 2019- HPV +, colpo and f/b cryo. h/o fibroid 11 years ago. denies ovarian cyst. 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.   Colonoscopy: - normal repeat 10 years PAP: 2023- normal Mammo: 2023- abnormal- biopsy negative.

## 2023-12-28 ENCOUNTER — NON-APPOINTMENT (OUTPATIENT)
Age: 63
End: 2023-12-28

## 2023-12-28 LAB
SURGICAL PATHOLOGY STUDY: SIGNIFICANT CHANGE UP
SURGICAL PATHOLOGY STUDY: SIGNIFICANT CHANGE UP

## 2024-01-03 ENCOUNTER — NON-APPOINTMENT (OUTPATIENT)
Age: 64
End: 2024-01-03

## 2024-01-03 ENCOUNTER — APPOINTMENT (OUTPATIENT)
Dept: HEMATOLOGY ONCOLOGY | Facility: CLINIC | Age: 64
End: 2024-01-03
Payer: COMMERCIAL

## 2024-01-03 ENCOUNTER — RESULT REVIEW (OUTPATIENT)
Age: 64
End: 2024-01-03

## 2024-01-03 VITALS
OXYGEN SATURATION: 99 % | RESPIRATION RATE: 18 BRPM | HEART RATE: 84 BPM | HEIGHT: 62 IN | DIASTOLIC BLOOD PRESSURE: 81 MMHG | SYSTOLIC BLOOD PRESSURE: 150 MMHG | WEIGHT: 137 LBS | BODY MASS INDEX: 25.21 KG/M2 | TEMPERATURE: 98.4 F

## 2024-01-03 PROCEDURE — 99245 OFF/OP CONSLTJ NEW/EST HI 55: CPT

## 2024-01-03 RX ORDER — LIDOCAINE AND PRILOCAINE 25; 25 MG/G; MG/G
2.5-2.5 CREAM TOPICAL
Qty: 1 | Refills: 4 | Status: ACTIVE | COMMUNITY
Start: 2024-01-03 | End: 1900-01-01

## 2024-01-03 RX ORDER — ONDANSETRON 4 MG/1
4 TABLET ORAL
Qty: 30 | Refills: 3 | Status: ACTIVE | COMMUNITY
Start: 2024-01-03 | End: 1900-01-01

## 2024-01-03 NOTE — ASSESSMENT
[FreeTextEntry1] : 63-year-old female presents today for initial consultation of high grade serous carcinoma, FIGO stage IIIB, referred by Dr. Harish Duenas.   Reviewed with patient pathology report, indication for systemic adjuvant chemotherapy with carboplatin and paclitaxel. plan for 6 cycles of chemo - Paclitaxel 175 mg/m2 Q 21 days x 6 - Carboplatin AUC 6 Q 21 days x 6 - Neulasta on pro - Mediport placement - genetic testing. - CT of the chest for staging - HRD testing   Patient offered genetic testing. We discussed: Plan for genetic panel.   Reviewed with patient impact of positive vs negative results and that test might not be informative or .  We discussed that blood related family members might be carrying the same genetic mutation. Test confidentiality.  Options or limitations of medical surveillance and strategies for prevention after genetic testing results. Importance of sharing genetic test results with at-risk relatives they might benefit from this information.  Plan for follow up after testing.  D/w Dr. Del Toro. Patient to start tx after clearance by Dr. Del Toro.

## 2024-01-03 NOTE — CONSULT LETTER
[Dear  ___] : Dear ~SHAILESH, [Consult Letter:] : I had the pleasure of evaluating your patient, [unfilled]. [Please see my note below.] : Please see my note below. [Consult Closing:] : Thank you very much for allowing me to participate in the care of this patient.  If you have any questions, please do not hesitate to contact me. [Sincerely,] : Sincerely, [FreeTextEntry3] : Chelsey Lyn MD  of Medicine [facility]

## 2024-01-07 ENCOUNTER — RESULT REVIEW (OUTPATIENT)
Age: 64
End: 2024-01-07

## 2024-01-12 ENCOUNTER — APPOINTMENT (OUTPATIENT)
Dept: GYNECOLOGIC ONCOLOGY | Facility: CLINIC | Age: 64
End: 2024-01-12
Payer: COMMERCIAL

## 2024-01-12 VITALS
DIASTOLIC BLOOD PRESSURE: 80 MMHG | OXYGEN SATURATION: 100 % | HEIGHT: 62 IN | WEIGHT: 140 LBS | SYSTOLIC BLOOD PRESSURE: 162 MMHG | HEART RATE: 76 BPM | TEMPERATURE: 97 F | BODY MASS INDEX: 25.76 KG/M2

## 2024-01-12 PROCEDURE — 99214 OFFICE O/P EST MOD 30 MIN: CPT | Mod: 24

## 2024-01-12 NOTE — REASON FOR VISIT
[FreeTextEntry1] : 1 Month Post-op   64yo s/p Exlap JASMYN, BSO, OMx and resection of diaphragmatic lesions here for 1 month post-op visit.  Patient reports feeling well, denies pain/bleeding, eating/drinking without difficulty, small amount of serous fluid leaking from umbilicus. Mediport scheduled for 23. Had genetic testing performed, results pending.  GYNHx; h/o 1 abnormal pap in 2019- HPV +, colpo and f/b cryo. h/o fibroid 11 years ago. denies ovarian cyst. 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  FHX: Family history of malignant neoplasm of brain vs benign tumor(V16.8) (Z80.8) : Paternal Grandfather Family history of malignant neoplasm of breast (V16.3) (Z80.3) : Sister at age 31yo Family history of malignant neoplasm of kidney (V16.51) (Z80.51) : Paternal Uncle  Colonoscopy: - normal repeat 10 years PAP: 2023- normal Mammo: 2023- abnormal- biopsy negative.

## 2024-01-12 NOTE — ASSESSMENT
[FreeTextEntry1] : Appropriate 1 month recovery.  I explained that Dr. Lyn and I discussed her treatment plan and I agree with the plan for chemotherapy +/- a PARP inhibitor pending genetics.  Patient offered ongoing follow up with our office or with Dr. Rose. She will likely see Dr Rose for surveillance.  [] Request HRD tumor testing  Patient being seen today for follow up after surgery for high grade ovarian cancer.  [facility]IC 95-27 Registry discussed with patient in detail and all questions answered by me. After discussion, patient agreed to participate in the study titled "FOCR: Familial Ovarian Cancer Registry: Family and Medical History and Biosample Resource Program" witnessed by Joselin Schwartz. A copy of the sign consent form was given to the patient along with the office's contact information should they have any other questions. Questionnaires were given to the patient to complete. Patient understands that she will be contacted at a later date for a blood draw. She is willing and able to comply with this requirement of the study. Will follow up with patient. Qualifying History: Personal hx of ovarian cancer, family history of brain tumor paternal grandfather, breast cancer sister, kidney cancer paternal ungle

## 2024-01-12 NOTE — DISCUSSION/SUMMARY
[FreeTextEntry1] : 64yo stage IIIB HGSOC s/p primary debulking with appropriate post-operative recovery. Will receive adjuvant chemotherapy at [place]. Genetic testing performed, tumour to be sent for HRD testing. PARP inhibitors discussed. Patient to follow-up with Dr. Lyn and Dr. Harish Duenas.

## 2024-01-12 NOTE — PHYSICAL EXAM
[Chaperone Present] : A chaperone was present in the examining room during all aspects of the physical examination [Absent] : Adnexa(ae): Absent [Normal] : Anus and perineum: Normal sphincter tone, no masses, no prolapse. [Fully active, able to carry on all pre-disease performance without restriction] : Status 0 - Fully active, able to carry on all pre-disease performance without restriction [de-identified] : midline vertical skin incision with small opening at the umbilicus, no signs of infection, probed with Q-tip - fascia intact

## 2024-01-12 NOTE — HISTORY OF PRESENT ILLNESS
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   Previous Therapies: 1) TVUS 10/30/23: Uterus: 7.8 cm x 4.3 cm x 4.4 cm. 3.0 cm fundal fibroid. 7.2 cm right fundal pedunculated fibroid on a stalk. Endometrium: 2 mm. Within normal limits. Trace fluid in the cavity Right ovary: 3.4 cm x 2.2 cm x 2.1 cm within normal limits Left ovary: Not seen 8.9 cm heterogeneous left adnexal mass of uncertain etiology Fluid: Trace free fluid. Impression: Enlarged multifibroid uterus including a 7.2 cm dominant right fundal pedunculated fibroid on a stalk. 8.9 cm heterogeneous left adnexal mass of uncertain etiology. Left ovary was not visualized. Pedunculated fibroid versus left adnexal mass is not excluded. Recommend MRI pelvis with and without contrast for further clarification.  2) CT 10/17/23: Lower chest: No significant abnormality Liver: Elongated right hepatic lobe 21 cm craniocaudad. No focal lesion Gallbladder: Numerous count oculi and lumen of normal sized gallbladder. Slight gallbladder wall thickening. No pericholecystic fluid Spleen: Normal in size and configuration Pancreas: Unremarkable Adrenals: No mass Kidneys/ureters: No calculi, mass, or hydronephrosis Bladder: Prominently fluid distended but otherwise grossly unremarkable Reproductive organs: Uterine fundus slightly to right of midline. Lobulated right superior para uterine mass measuring 7 x 5.5 x 7 cm. Bowel: Diverticuli in sigmoid colon with long segmental wall thickening of the upper sigmoid colon and prominent pericolic inflammatory change. No bowel obstruction. Appendix appears normal in caliber Peritoneum: Small amount of free fluid in the posterior pelvis. No free air Vessels: No aneurysm. Mild arthrosclerotic calcification. Retroperitoneum/lymph nodes: No lymphadenopathy Abdominal wall: No abnormal mass or fluid collection Bones: No fracture or focal destructive or blastic lesion Impression: Findings compatible with acute diverticulitis of the upper sigmoid colon. Cholelithiasis and slight gallbladder wall thickening. No pericholecystic fluid or biliary duct dilation A lobulated right superior periuterine mass measuring 7 x 5.5 x 7 cm may be a pedunculated myoma, with masses of other etiology not excluded. Follow-up pelvic ultrasound can be performed for further evaluation in this regard  3) MRI 11/29/23: FINDINGS: UTERUS: 8.6 x 3.7 x 3.5 cm. Fundal intramural/subserosal fibroid, 3.5 x 2.6 cm (401, 32) ENDOMETRIUM: Within normal limits. JUNCTIONAL ZONE: Within normal limits. RIGHT OVARY: 8.5 x 7.0 x 7.5 cm (TV, AP, CC) predominantly solid mass replacing the right ovary with cystic components. Dominant 4 cm cystic component contains mildly T1 hyperintense proteinaceous fluid. LEFT OVARY: 8.0 x 5.3 x 4.0 cm (AP, CC, TV) predominantly solid mass replacing the left ovary with small cystic components. ADNEXA: As above. BLADDER: Within normal limits. LYMPH NODES: No pelvic lymphadenopathy. VISUALIZED PORTIONS: ABDOMINAL ORGANS: Within normal limits. BOWEL: Within normal limits. PERITONEUM: Small volume ascites. Findings suspicious for peritoneal carcinomatosis, subtle peritoneal enhancement and at least one discrete peritoneal nodule identified, a 5 mm posterior pelvic nodule (301, 25). VESSELS: Within normal small fat-containing umbilical hernia. BONES: No aggressive osseous lesion. IMPRESSION: Bilateral ovarian masses and findings suspicious for peritoneal carcinomatosis with small volume ascites. In light of the elevated CA-125 levels, primary ovarian malignancy is most likely although differential would include ovarian metastases.  4) S/P Exlap JASMYN, BSO, OMx and resection of diaphragmatic lesions 12/14/23     a) Right fallopian tube and ovary - High grade serous carcinoma involving ovary - Benign fallopian tube     b) Left fallopian tube and ovary - High grade serous carcinoma involving ovary and fallopian tube     c)  Uterus and cervix - Endometrium with small focus of atypical hyperplasia, see note - Myometrium with leiomyoma - Benign cervix **Note:  The entire endometrium has been submitted for microscopicexamination.      d)  Omentum - Benign fibroadipose tissue      e)  Right diaphragmatic nodule - High grade serous carcinoma involving fibrous tissue, see note ***Note:  The tumor is best seen on the permanent stained slide.       f)  Right diaphragmatic nodule #2 - High grade serous carcinoma involving fibrous tissue, see note ***Note:  The tumor is best seen on the permanent stained slide.       g)  Nodule over inferior vena cava - High grade serous carcinoma  involving fibrous tissue  Verified by: Gracy Melendez MD (Electronic Signature) Reported on: 12/28/23 09:48 EST, [id number] [place] [address] Phone: [phone]   Fax: [phone] _________________________________________________________________  Synoptic Summary 2: Ovary, Fallopian Tube, or Primary Perito Specimen Procedure:  Total hysterectomy and bilateral salpingo-oophorectomy;  Omentectomy Right Ovary Integrity:   Capsule intact Left Ovary Integrity:   Capsule ruptured Right Fallopian Tube Integrity:   Serosa intact Left Fallopian Tube Integrity:   Serosa intact Tumor Tumor Site:  Bilateral ovaries Tumor Size:  9 Centimeters (cm) Histologic Type:   High grade serous carcinoma Histologic Grade:   High grade Ovarian Surface Involvement:   Not identified Fallopian Tube Surface Involvement:    Not identified Other Tissue / Organ Involvement:    Pelvic peritoneum;  Abdominal peritoneum Largest Extrapelvic Peritoneal Focus:    Macroscopic (2 cm or less) Peritoneal / Ascitic Fluid Involvement:    Not submitted / unknown Chemotherapy Response Score (CRS):    No known presurgical therapy Regional Lymph Nodes Regional Lymph Node Status:   Not applicable (no regional lymph nodes submitted or found)  pTNM Classification (AJCC 8th Edition) pT Category:   pT3b pN Category:   pN not assigned (no nodes submitted or found) FIGO Stage FIGO Stage:  IIIB

## 2024-01-15 ENCOUNTER — RESULT REVIEW (OUTPATIENT)
Age: 64
End: 2024-01-15

## 2024-01-17 ENCOUNTER — NON-APPOINTMENT (OUTPATIENT)
Age: 64
End: 2024-01-17

## 2024-01-18 ENCOUNTER — RESULT REVIEW (OUTPATIENT)
Age: 64
End: 2024-01-18

## 2024-01-19 LAB
ALBUMIN SERPL ELPH-MCNC: 5.1 G/DL
ALP BLD-CCNC: 76 U/L
ALT SERPL-CCNC: 34 U/L
ANION GAP SERPL CALC-SCNC: 23 MMOL/L
AST SERPL-CCNC: 22 U/L
BASOPHILS # BLD AUTO: 0.09 K/UL
BASOPHILS NFR BLD AUTO: 1.2 %
BILIRUB SERPL-MCNC: 0.4 MG/DL
BUN SERPL-MCNC: 14 MG/DL
CALCIUM SERPL-MCNC: 10.3 MG/DL
CANCER AG125 SERPL-ACNC: 92 U/ML
CEA SERPL-MCNC: 1.1 NG/ML
CHLORIDE SERPL-SCNC: 98 MMOL/L
CO2 SERPL-SCNC: 22 MMOL/L
CREAT SERPL-MCNC: 0.57 MG/DL
CRP SERPL-MCNC: <3 MG/L
EGFR: 103 ML/MIN/1.73M2
EOSINOPHIL # BLD AUTO: 0.08 K/UL
EOSINOPHIL NFR BLD AUTO: 1.1 %
ERYTHROCYTE [SEDIMENTATION RATE] IN BLOOD BY WESTERGREN METHOD: 33 MM/HR
FERRITIN SERPL-MCNC: 362 NG/ML
GLUCOSE SERPL-MCNC: 75 MG/DL
HBV CORE IGG+IGM SER QL: NONREACTIVE
HBV SURFACE AB SER QL: NONREACTIVE
HBV SURFACE AG SER QL: NONREACTIVE
HCT VFR BLD CALC: 41.5 %
HCV AB SER QL: NONREACTIVE
HCV S/CO RATIO: 0.11 S/CO
HGB BLD-MCNC: 12.7 G/DL
IMM GRANULOCYTES NFR BLD AUTO: 0.5 %
IRON SATN MFR SERPL: 12 %
IRON SERPL-MCNC: 42 UG/DL
LDH SERPL-CCNC: 284 U/L
LYMPHOCYTES # BLD AUTO: 1.53 K/UL
LYMPHOCYTES NFR BLD AUTO: 20.2 %
MAGNESIUM SERPL-MCNC: 2.1 MG/DL
MAN DIFF?: NORMAL
MCHC RBC-ENTMCNC: 28.3 PG
MCHC RBC-ENTMCNC: 30.6 GM/DL
MCV RBC AUTO: 92.4 FL
MONOCYTES # BLD AUTO: 0.69 K/UL
MONOCYTES NFR BLD AUTO: 9.1 %
NEUTROPHILS # BLD AUTO: 5.14 K/UL
NEUTROPHILS NFR BLD AUTO: 67.9 %
PHOSPHATE SERPL-MCNC: 4.8 MG/DL
PLATELET # BLD AUTO: 454 K/UL
POTASSIUM SERPL-SCNC: 4.5 MMOL/L
PROT SERPL-MCNC: 7.7 G/DL
RBC # BLD: 4.49 M/UL
RBC # FLD: 13.9 %
SODIUM SERPL-SCNC: 143 MMOL/L
TIBC SERPL-MCNC: 336 UG/DL
UIBC SERPL-MCNC: 294 UG/DL
URATE SERPL-MCNC: 2.9 MG/DL
VIT B12 SERPL-MCNC: 436 PG/ML
WBC # FLD AUTO: 7.57 K/UL

## 2024-02-01 NOTE — BRIEF OPERATIVE NOTE - OPERATION/FINDINGS
Progress Note - Jhonny Eastman 43 y.o. male MRN: [id number]    Unit/Bed#: [address] 211-02 Encounter: [id number]      CC: diabetes f/u    Subjective:   Jhonny Eastman is a 43 y.o. year old male with type 2 diabetes.  Feels well.  No complaints.  No hypoglycemia. BG have been elevated in 250-300 range in last 24hrs     Objective:     Vitals: Blood pressure (!) 147/101, pulse 92, temperature 97.7 °F (36.5 °C), resp. rate 16, height 6' (1.829 m), weight 114 kg (251 lb 12.3 oz), SpO2 94%.,Body mass index is 34.15 kg/m².    No intake or output data in the 24 hours ending 02/01/24 1111    Physical Exam:  General Appearance: awake, appears stated age and cooperative  Head: Normocephalic, without obvious abnormality, atraumatic  Extremities: moves all extremities  Skin: Skin color and temperature normal.   Pulm: no labored breathing    Lab, Imaging and other studies: I have personally reviewed pertinent reports.      POC Glucose (mg/dl)   Date Value   02/01/2024 296 (H)   02/01/2024 269 (H)   01/31/2024 288 (H)   01/31/2024 308 (H)   01/31/2024 269 (H)   01/31/2024 151 (H)   01/31/2024 165 (H)   01/31/2024 193 (H)   01/30/2024 103   01/30/2024 128       Assessment:  diabetes with hyperglycemia  Assessment and plan:  43 y.o.-year-old male with diabetes with hyperglycemia unclear dx since did not have physician follow up prior. Unsure if current diabetes progressed d/t steroid use vs d/t lack of tx. Admitted for mild DKA with ?new onset diabetes. DKA resolved now on MDI. Having prandial and fasting hyperglycemia and hence would recommend increasing both basal/bolus modestly.     Plan:  - Inc Lantus 40u   - Inc lispro 13u with meals   Will need OP adjustment to regime, recommend follow up with Endocrine     Please see consult note for discharge supplies      Portions of the record may have been created with voice recognition software.     Examination under anaesthesia revealed a 15cm pelvic mass. Laparoscopic entry gained via optiview in the left upper quadrant. Multiple subcentimeter nodules noted along the diaphgramatic surface above the liver, as well as bilateral multicystic ovarian masses. No other disease noted within the abdomen. At this point, decision was made to convert to exploratory laparotomy. A midline vertical skin incision was made. Bilateral salpingo-oophorectomy was performed and both ovaries/tubes were sent for frozen section - result was cancer of indeterminate origin. The total hysterectomy was then performed in the usual fashion. Omentum appeared normal on inspection, omentectomy performed. Attention was turned to the upper abdomen. The falciform ligament was divided and the liver was mobilised. 3 separate subcentimeter nodules noted along the diaphragmatic surface. Intra-operative consult was obtained from surgical oncology and thoracic surgery due to close relation to the diaphragm and inferior vena cava. The 3 lesions were excised and Examination under anaesthesia revealed a 15cm pelvic mass. Laparoscopic entry gained via optiview in the left upper quadrant. Multiple subcentimeter nodules noted along the diaphragmatic surface above the liver, as well as bilateral multicystic ovarian masses. No other disease noted within the abdomen. At this point, decision was made to convert to exploratory laparotomy. A midline vertical skin incision was made. Bilateral salpingo-oophorectomy was performed and both ovaries/tubes were sent for frozen section - result was cancer of indeterminate origin. The total hysterectomy was then performed in the usual fashion. Omentum appeared normal on inspection, omentectomy performed. Attention was turned to the upper abdomen. The falciform ligament was divided and the liver was mobilised. 3 separate subcentimeter nodules noted along the diaphragmatic surface. Intra-operative consult was obtained from surgical oncology and thoracic surgery due to close relation to the diaphragm and inferior vena cava. The 3 lesions were excised and sent for frozen section - result cancer. Due to surface diaphragm involvement and already a stage III, decision made not to perform lymphadenectomy. The fascia was closed with loop PDS in 2 portions. Subcutaneous tissue closed in layers. Skin closed with monocryl and dermabond, pressure dressing placed. Examination under anaesthesia revealed a 15cm pelvic mass. Laparoscopic entry gained via optiview in the left upper quadrant. Multiple subcentimeter nodules noted along the diaphragmatic surface above the liver, as well as bilateral multicystic ovarian masses. No other disease noted within the abdomen. At this point, decision was made to convert to exploratory laparotomy. A midline vertical skin incision was made. Bilateral salpingo-oophorectomy was performed and both ovaries/tubes were sent for frozen section - result was cancer of indeterminate origin. The total hysterectomy was then performed in the usual fashion. Omentum appeared normal on inspection, omentectomy performed. Attention was turned to the upper abdomen. The falciform ligament was divided and the liver was mobilised. 3 separate subcentimeter nodules noted along the diaphragmatic surface. Intra-operative consult was obtained from surgical oncology and thoracic surgery due to close relation to the diaphragm and inferior vena cava. The 3 lesions were excised and sent for frozen section - result cancer. Due to surface diaphragm involvement and already a stage III, decision made not to perform lymphadenectomy. Complete resection of all visible disease. The fascia was closed with loop PDS in 2 portions. Subcutaneous tissue closed in layers. Skin closed with monocryl and dermabond, pressure dressing placed.

## 2024-02-07 ENCOUNTER — APPOINTMENT (OUTPATIENT)
Dept: HEMATOLOGY ONCOLOGY | Facility: CLINIC | Age: 64
End: 2024-02-07
Payer: COMMERCIAL

## 2024-02-07 ENCOUNTER — LABORATORY RESULT (OUTPATIENT)
Age: 64
End: 2024-02-07

## 2024-02-07 ENCOUNTER — RESULT REVIEW (OUTPATIENT)
Age: 64
End: 2024-02-07

## 2024-02-07 PROCEDURE — 36415 COLL VENOUS BLD VENIPUNCTURE: CPT

## 2024-02-07 PROCEDURE — 99214 OFFICE O/P EST MOD 30 MIN: CPT | Mod: 25

## 2024-02-07 NOTE — CONSULT LETTER
[Dear  ___] : Dear ~SHAILESH, [Consult Letter:] : I had the pleasure of evaluating your patient, [unfilled]. [Please see my note below.] : Please see my note below. [Consult Closing:] : Thank you very much for allowing me to participate in the care of this patient.  If you have any questions, please do not hesitate to contact me. [Sincerely,] : Sincerely, [FreeTextEntry3] : Chelsey Lyn MD  of Medicine [facility]

## 2024-02-07 NOTE — HISTORY OF PRESENT ILLNESS
[de-identified] : 63 year old female presents today for initial consultation of high grade serous carcinoma, FIGO stage IIIB, referred by Dr. Harish Duenas.  Patient originally presented to ED with abdominal pain and was found to have a lobulated right uterine mass along with diverticulitis.   CT 10/17/23- Findings compatible with acute diverticulitis of the upper sigmoid colon. Cholelithiasis and slight gallbladder wall thickening. No pericholecystic fluid or biliary duct dilation. A lobulated right superior periuterine mass measuring 7 x 5.5 x 7 cm may be a pedunculated myoma, with masses of other etiology not excluded. Follow-up pelvic ultrasound can be performed for further evaluation in this regard.  F/U TC US dated 10/30/23: Enlarged multifibroid uterus including a 7.2 cm dominant right fundal pedunculated fibroid on a stalk. 8.9 cm heterogeneous left adnexal mass of uncertain etiology. Left ovary was not visualized. Pedunculated fibroid versus left adnexal mass is not excluded. Recommend MRI pelvis with and without contrast for further clarification.  MRI 23: Bilateral ovarian masses and findings suspicious for peritoneal carcinomatosis with small volume ascites. In light of the elevated CA-125 levels, primary ovarian malignancy is most likely although differential would include ovarian metastases.  Patient underwent TAHBSO with OMx and resection of diaphragmatic lesions wIth Dr. Moser on 23:  Pathology: Final Diagnosis 1.   Right fallopian tube and ovary - High grade serous carcinoma involving ovary - Benign fallopian tube  2.    Left fallopian tube and ovary - High grade serous carcinoma involving ovary and fallopian tube  3.   Uterus and cervix - Endometrium with small focus of atypical hyperplasia, see note - Myometrium with leiomyoma - Benign cervix  Note:  The entire endometrium has been submitted for microscopic examination.  4.   Omentum - Benign fibroadipose tissue  5.   Right diaphragmatic nodule - High grade serous carcinoma involving fibrous tissue, see note  Note:  The tumor is best seen on the permanent stained slide.  6  Right diaphragmatic nodule #2 - High grade serous carcinoma involving fibrous tissue, see note  Note:  The tumor is best seen on the permanent stained slide.  7.   Nodule over inferior vena cava - High grade serous carcinoma  involving fibrous tissue  Synoptic Summary 2: Ovary, Fallopian Tube, or Primary Perito Specimen Procedure:  Total hysterectomy and bilateral salpingo-oophorectomy;  Omentectomy Right Ovary Integrity:   Capsule intact Left Ovary Integrity:   Capsule ruptured Right Fallopian Tube Integrity:   Serosa intact Left Fallopian Tube Integrity:   Serosa intact Tumor Tumor Site:  Bilateral ovaries Tumor Size:  9 Centimeters (cm) Histologic Type:   High grade serous carcinoma Histologic Grade:   High grade Ovarian Surface Involvement:   Not identified Fallopian Tube Surface Involvement:    Not identified Other Tissue / Organ Involvement:    Pelvic peritoneum;  Abdominal peritoneum Largest Extrapelvic Peritoneal Focus:    Macroscopic (2 cm or less) Peritoneal / Ascitic Fluid Involvement:    Not submitted / unknown Chemotherapy Response Score (CRS):    No known presurgical therapy Regional Lymph Nodes Regional Lymph Node Status:   Not applicable (no regional lymph nodes submitted or found)  pTNM Classification (AJCC 8th Edition) pT Category:   pT3b pN Category:   pN not assigned (no nodes submitted or found) FIGO Stage:  IIIB  Screenings: Colonoscopy: - normal repeat 10 years PAP: 2023- normal Mammo: 2023- abnormal- biopsy negative per pt   GYNHx; h/o 1 abnormal pap in 2019- HPV +, colpo and f/b cryo. h/o fibroid 11 years ago. denies ovarian cyst. 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  Had some bone pain post cycle 1

## 2024-02-07 NOTE — ASSESSMENT
[FreeTextEntry1] : 63-year-old female presents today for initial consultation of high grade serous carcinoma, FIGO stage IIIB, referred by Dr. Harish Duenas.   Reviewed with patient pathology report, indication for systemic adjuvant chemotherapy with carboplatin and paclitaxel. plan for 6 cycles of chemo - Paclitaxel 175 mg/m2 Q 21 days x 6 - Carboplatin AUC 6 Q 21 days x 6 - Neulasta on pro - Mediport placement - genetic testing. - CT of the chest for staging - HRD testing   Patient offered genetic testing. We discussed: Plan for genetic panel.   Reviewed with patient impact of positive vs negative results and that test might not be informative or .  We discussed that blood related family members might be carrying the same genetic mutation. Test confidentiality.  Options or limitations of medical surveillance and strategies for prevention after genetic testing results. Importance of sharing genetic test results with at-risk relatives they might benefit from this information.  Plan for follow up after testing.  Cycle 2 Taxol carbo- 2/7/24- tolerated cycle 1- some bone pain took claritin and tylenol with relief   #hyperglycemia- - on steroids pre chemo, can check a1c at next visit   #HTN- elevated when in office, takes in home in am usually 120/70's  Case and mgmt discussed with Dr. Lyn- return in 3 weeks reg labs and ca 125

## 2024-02-07 NOTE — REVIEW OF SYSTEMS
[Diarrhea: Grade 0] : Diarrhea: Grade 0 [Negative] : Allergic/Immunologic [Abdominal Pain] : no abdominal pain [Vomiting] : no vomiting

## 2024-02-28 ENCOUNTER — RESULT REVIEW (OUTPATIENT)
Age: 64
End: 2024-02-28

## 2024-02-28 ENCOUNTER — LABORATORY RESULT (OUTPATIENT)
Age: 64
End: 2024-02-28

## 2024-02-28 ENCOUNTER — APPOINTMENT (OUTPATIENT)
Dept: HEMATOLOGY ONCOLOGY | Facility: CLINIC | Age: 64
End: 2024-02-28
Payer: COMMERCIAL

## 2024-02-28 VITALS
HEART RATE: 103 BPM | DIASTOLIC BLOOD PRESSURE: 77 MMHG | HEIGHT: 62 IN | SYSTOLIC BLOOD PRESSURE: 144 MMHG | RESPIRATION RATE: 16 BRPM | WEIGHT: 147.31 LBS | OXYGEN SATURATION: 98 % | BODY MASS INDEX: 27.11 KG/M2

## 2024-02-28 DIAGNOSIS — D25.9 LEIOMYOMA OF UTERUS, UNSPECIFIED: ICD-10-CM

## 2024-02-28 PROCEDURE — 36415 COLL VENOUS BLD VENIPUNCTURE: CPT

## 2024-02-28 PROCEDURE — 99214 OFFICE O/P EST MOD 30 MIN: CPT | Mod: 25

## 2024-02-28 NOTE — HISTORY OF PRESENT ILLNESS
[de-identified] : 63 year old female presents today for initial consultation of high grade serous carcinoma, FIGO stage IIIB, referred by Dr. Harish Duenas.  Patient originally presented to ED with abdominal pain and was found to have a lobulated right uterine mass along with diverticulitis.   CT 10/17/23- Findings compatible with acute diverticulitis of the upper sigmoid colon. Cholelithiasis and slight gallbladder wall thickening. No pericholecystic fluid or biliary duct dilation. A lobulated right superior periuterine mass measuring 7 x 5.5 x 7 cm may be a pedunculated myoma, with masses of other etiology not excluded. Follow-up pelvic ultrasound can be performed for further evaluation in this regard.  F/U TC US dated 10/30/23: Enlarged multifibroid uterus including a 7.2 cm dominant right fundal pedunculated fibroid on a stalk. 8.9 cm heterogeneous left adnexal mass of uncertain etiology. Left ovary was not visualized. Pedunculated fibroid versus left adnexal mass is not excluded. Recommend MRI pelvis with and without contrast for further clarification.  MRI 23: Bilateral ovarian masses and findings suspicious for peritoneal carcinomatosis with small volume ascites. In light of the elevated CA-125 levels, primary ovarian malignancy is most likely although differential would include ovarian metastases.  Patient underwent TAHBSO with OMx and resection of diaphragmatic lesions wIth Dr. Moser on 23:  Pathology: Final Diagnosis 1.   Right fallopian tube and ovary - High grade serous carcinoma involving ovary - Benign fallopian tube  2.    Left fallopian tube and ovary - High grade serous carcinoma involving ovary and fallopian tube  3.   Uterus and cervix - Endometrium with small focus of atypical hyperplasia, see note - Myometrium with leiomyoma - Benign cervix  Note:  The entire endometrium has been submitted for microscopic examination.  4.   Omentum - Benign fibroadipose tissue  5.   Right diaphragmatic nodule - High grade serous carcinoma involving fibrous tissue, see note  Note:  The tumor is best seen on the permanent stained slide.  6  Right diaphragmatic nodule #2 - High grade serous carcinoma involving fibrous tissue, see note  Note:  The tumor is best seen on the permanent stained slide.  7.   Nodule over inferior vena cava - High grade serous carcinoma  involving fibrous tissue  Synoptic Summary 2: Ovary, Fallopian Tube, or Primary Perito Specimen Procedure:  Total hysterectomy and bilateral salpingo-oophorectomy;  Omentectomy Right Ovary Integrity:   Capsule intact Left Ovary Integrity:   Capsule ruptured Right Fallopian Tube Integrity:   Serosa intact Left Fallopian Tube Integrity:   Serosa intact Tumor Tumor Site:  Bilateral ovaries Tumor Size:  9 Centimeters (cm) Histologic Type:   High grade serous carcinoma Histologic Grade:   High grade Ovarian Surface Involvement:   Not identified Fallopian Tube Surface Involvement:    Not identified Other Tissue / Organ Involvement:    Pelvic peritoneum;  Abdominal peritoneum Largest Extrapelvic Peritoneal Focus:    Macroscopic (2 cm or less) Peritoneal / Ascitic Fluid Involvement:    Not submitted / unknown Chemotherapy Response Score (CRS):    No known presurgical therapy Regional Lymph Nodes Regional Lymph Node Status:   Not applicable (no regional lymph nodes submitted or found)  pTNM Classification (AJCC 8th Edition) pT Category:   pT3b pN Category:   pN not assigned (no nodes submitted or found) FIGO Stage:  IIIB  Screenings: Colonoscopy: - normal repeat 10 years PAP: 2023- normal Mammo: 2023- abnormal- biopsy negative per pt   GYNHx; h/o 1 abnormal pap in 2019- HPV +, colpo and f/b cryo. h/o fibroid 11 years ago. denies ovarian cyst. 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  She had a slight cough, no other sx's

## 2024-02-28 NOTE — ASSESSMENT
[FreeTextEntry1] : 63-year-old female presents today for initial consultation of high grade serous carcinoma, FIGO stage IIIB, referred by Dr. Harish Duenas.   Reviewed with patient pathology report, indication for systemic adjuvant chemotherapy with carboplatin and paclitaxel. plan for 6 cycles of chemo - Paclitaxel 175 mg/m2 Q 21 days x 6 - Carboplatin AUC 6 Q 21 days x 6 - Neulasta on pro - Mediport placement - genetic testing. - CT of the chest for staging - HRD testing   Patient offered genetic testing. We discussed: Plan for genetic panel.   Reviewed with patient impact of positive vs negative results and that test might not be informative or .  We discussed that blood related family members might be carrying the same genetic mutation. Test confidentiality.  Options or limitations of medical surveillance and strategies for prevention after genetic testing results. Importance of sharing genetic test results with at-risk relatives they might benefit from this information.  Plan for follow up after testing.  Cycle 2 Taxol carbo- 2/7/24- tolerated cycle 1- some bone pain took claritin and tylenol with relief  Cycle 3 Taxol/Carbo- 2/28/24- feeling well, since decline in Ca 125, would re image post cycle 6  #hyperglycemia-pt on steroids, check A1c   Ca 125- 38 trending down   Case and mgmt discussed with Dr. Lyn- return in 3 weeks reg labs and ca 125

## 2024-02-28 NOTE — REVIEW OF SYSTEMS
[Diarrhea: Grade 0] : Diarrhea: Grade 0 [Negative] : Endocrine [Abdominal Pain] : no abdominal pain [Vomiting] : no vomiting

## 2024-03-08 ENCOUNTER — APPOINTMENT (OUTPATIENT)
Dept: GYNECOLOGIC ONCOLOGY | Facility: CLINIC | Age: 64
End: 2024-03-08
Payer: COMMERCIAL

## 2024-03-08 VITALS — OXYGEN SATURATION: 99 % | HEART RATE: 91 BPM | DIASTOLIC BLOOD PRESSURE: 78 MMHG | SYSTOLIC BLOOD PRESSURE: 154 MMHG

## 2024-03-08 PROCEDURE — G2211 COMPLEX E/M VISIT ADD ON: CPT | Mod: 1L

## 2024-03-08 PROCEDURE — 99024 POSTOP FOLLOW-UP VISIT: CPT

## 2024-03-08 NOTE — DISCUSSION/SUMMARY
[Reviewed Clinical Lab Test(s)] : Results of clinical tests were reviewed. [Reviewed Radiology Report(s)] : Radiology reports were reviewed. [Discuss Alternatives/Risks/Benefits w/Patient] : All alternatives, risks, and benefits were discussed with the patient/family and all questions were answered.  Patient expressed good understanding and appreciates the importance of follow up as recommended. [Visit Time ___ Minutes] : [unfilled] minutes [Face to Face Time___ Minutes] : with [unfilled] minutes in face to face consultation. [FreeTextEntry1] : 62yo w/ stage 3B HGS ovarian cancer s/p primary debulking surgery, R0 resection. Doing very well. started adjuvant carbo/taxol q3wks, s/p C3. Due for C4 . OK to continue tx as per  medonc. [employer] and [employer] testing pending. -more than 50% of visit spent face to face with patient counseling and coordinating care -I reviewed diagnosis, stage of disease, standard or care tx plan. Reviewed plan to continue current regimen for total 6 cycles. Reviewed plan to f/u germline and somatic testing and then we can discuss further plan for maintenance therapy with PARPi. -f/u medonc as scheduled; plan for post-tx scan after C6 -rtc 2months -pain/fever/bleeding precautions given

## 2024-03-08 NOTE — HISTORY OF PRESENT ILLNESS
[FreeTextEntry1] : 62yo here for follow up, Active tx  Dx: Stage 3B HGS ovarian cancer, [employer] and [employer] testing pending Tx: Exlap JASMYN, BSO, OMx and resection of diaphragmatic lesions, optimal R0 resection on 12/13/14 Adjtx: carbo/taxol q3wks (Dr. Lyn) started 1/18/24 to present  Since last visit patient reports doing well. she underwent PDS @ [facility] with Dr. Del Toro and started adjuvant chemo and is s/p C3 on 2/28/24. She is tolerating treatment well with minimal SEs/ toxicities. she denies pain/fever/bleeding/bloating. She has normal activity tolerance and normal appetite on her good days during tx cycle. Reports normal urination and BMs.  Ca125 preop = 608 (11/2023)-->> 92 (1/2024)--> 17 (2/28/24)  Pathology Specimen(s) Submitted 1  Right fallopian tube and ovary 2  Left fallopian tube and ovary 3  Uterus and cervix 4  Omentum 5  Right diaphragmatic nodule 6  Right diaphragmatic nodule #2 7  Nodule over inferior vena cava   Final Diagnosis 1.   Right fallopian tube and ovary - High grade serous carcinoma involving ovary - Benign fallopian tube  2.    Left fallopian tube and ovary - High grade serous carcinoma involving ovary and fallopian tube  3.   Uterus and cervix - Endometrium with small focus of atypical hyperplasia, see note - Myometrium with leiomyoma - Benign cervix  Note:  The entire endometrium has been submitted for microscopic examination.  4.   Omentum - Benign fibroadipose tissue  5.   Right diaphragmatic nodule - High grade serous carcinoma involving fibrous tissue, see note  Note:  The tumor is best seen on the permanent stained slide.  6  Right diaphragmatic nodule #2 - High grade serous carcinoma involving fibrous tissue, see note  Note:  The tumor is best seen on the permanent stained slide.  7.   Nodule over inferior vena cava - High grade serous carcinoma  involving fibrous tissue  Verified by: Gracy Melendez MD (Electronic Signature) Reported on: 12/28/23 09:48 EST, [facility] [place] [address] Phone: [phone]   Fax: [phone] _________________________________________________________________  Synoptic Summary 2: Ovary, Fallopian Tube, or Primary Perito Specimen Procedure:  Total hysterectomy and bilateral salpingo-oophorectomy;  Omentectomy Right Ovary Integrity:   Capsule intact Left Ovary Integrity:   Capsule ruptured Right Fallopian Tube Integrity:   Serosa intact Left Fallopian Tube Integrity:   Serosa intact Tumor Tumor Site:  Bilateral ovaries Tumor Size:  9 Centimeters (cm) Histologic Type:   High grade serous carcinoma Histologic Grade:   High grade Ovarian Surface Involvement:   Not identified Fallopian Tube Surface Involvement:    Not identified Other Tissue / Organ Involvement:    Pelvic peritoneum;  Abdominal peritoneum Largest Extrapelvic Peritoneal Focus:    Macroscopic (2 cm or less) Peritoneal / Ascitic Fluid Involvement:    Not submitted / unknown Chemotherapy Response Score (CRS):    No known presurgical therapy Regional Lymph Nodes Regional Lymph Node Status:   Not applicable (no regional lymph nodes submitted or found)  pTNM Classification (AJCC 8th Edition) pT Category:   pT3b pN Category:   pN not assigned (no nodes submitted or found) FIGO Stage FIGO Stage:  IIIB

## 2024-03-20 ENCOUNTER — APPOINTMENT (OUTPATIENT)
Dept: HEMATOLOGY ONCOLOGY | Facility: CLINIC | Age: 64
End: 2024-03-20
Payer: COMMERCIAL

## 2024-03-20 ENCOUNTER — RESULT REVIEW (OUTPATIENT)
Age: 64
End: 2024-03-20

## 2024-03-20 VITALS
RESPIRATION RATE: 16 BRPM | HEART RATE: 107 BPM | HEIGHT: 62 IN | BODY MASS INDEX: 27.05 KG/M2 | TEMPERATURE: 97.2 F | SYSTOLIC BLOOD PRESSURE: 149 MMHG | DIASTOLIC BLOOD PRESSURE: 80 MMHG | OXYGEN SATURATION: 100 % | WEIGHT: 147 LBS

## 2024-03-20 PROCEDURE — 99215 OFFICE O/P EST HI 40 MIN: CPT

## 2024-03-20 RX ORDER — NEOMYCIN SULFATE 500 MG/1
500 TABLET ORAL
Qty: 6 | Refills: 0 | Status: COMPLETED | COMMUNITY
Start: 2023-12-05 | End: 2024-03-20

## 2024-03-20 NOTE — ASSESSMENT
[FreeTextEntry1] : 63-year-old female presents today for initial consultation of high grade serous carcinoma, FIGO stage IIIB, referred by Dr. Harish Duenas.   Reviewed with patient pathology report, indication for systemic adjuvant chemotherapy with carboplatin and paclitaxel. plan for 6 cycles of chemo - Paclitaxel 175 mg/m2 Q 21 days x 6 - Carboplatin AUC 6 Q 21 days x 6 - Neulasta on pro - Mediport placement - genetic testing. - CT of the chest for staging - HRD testing  Patient offered genetic testing. MLH1 - VUS - referred to genetic counselor   Cycle 2 Taxol carbo- 2/7/24- tolerated cycle 1- some bone pain took Claritin and tylenol with relief  Cycle 3 Taxol/Carbo- 2/28/24- feeling well, since decline in Ca 125, would re image post cycle 6 Cycle 4 - Taxol/ Carbo 3/20/24- continue treatment. Patient with cancer being monitored with weekly CBC, LFT labs for potential adverse effects/toxicity of liver and kidney damage due to cytotoxic chemotherapy. Hypophosphatemia - replacement.  # Neuropathy- right hand Grade 1  Ca 125- 38 trending down    return in 3 weeks reg labs and ca 125

## 2024-03-20 NOTE — HISTORY OF PRESENT ILLNESS
[Disease: _____________________] : Disease: [unfilled] [de-identified] : 63 year old female presents today for initial consultation of high grade serous carcinoma, FIGO stage IIIB, referred by Dr. Harish Duenas.  Patient originally presented to ED with abdominal pain and was found to have a lobulated right uterine mass along with diverticulitis.   CT 10/17/23- Findings compatible with acute diverticulitis of the upper sigmoid colon. Cholelithiasis and slight gallbladder wall thickening. No pericholecystic fluid or biliary duct dilation. A lobulated right superior periuterine mass measuring 7 x 5.5 x 7 cm may be a pedunculated myoma, with masses of other etiology not excluded. Follow-up pelvic ultrasound can be performed for further evaluation in this regard.  F/U TC US dated 10/30/23: Enlarged multifibroid uterus including a 7.2 cm dominant right fundal pedunculated fibroid on a stalk. 8.9 cm heterogeneous left adnexal mass of uncertain etiology. Left ovary was not visualized. Pedunculated fibroid versus left adnexal mass is not excluded. Recommend MRI pelvis with and without contrast for further clarification.  MRI 23: Bilateral ovarian masses and findings suspicious for peritoneal carcinomatosis with small volume ascites. In light of the elevated CA-125 levels, primary ovarian malignancy is most likely although differential would include ovarian metastases.  Patient underwent TAHBSO with OMx and resection of diaphragmatic lesions wIth Dr. Moser on 23:  Pathology: Final Diagnosis 1.   Right fallopian tube and ovary - High grade serous carcinoma involving ovary - Benign fallopian tube  2.    Left fallopian tube and ovary - High grade serous carcinoma involving ovary and fallopian tube  3.   Uterus and cervix - Endometrium with small focus of atypical hyperplasia, see note - Myometrium with leiomyoma - Benign cervix  Note:  The entire endometrium has been submitted for microscopic examination.  4.   Omentum - Benign fibroadipose tissue  5.   Right diaphragmatic nodule - High grade serous carcinoma involving fibrous tissue, see note  Note:  The tumor is best seen on the permanent stained slide.  6  Right diaphragmatic nodule #2 - High grade serous carcinoma involving fibrous tissue, see note  Note:  The tumor is best seen on the permanent stained slide.  7.   Nodule over inferior vena cava - High grade serous carcinoma  involving fibrous tissue  Synoptic Summary 2: Ovary, Fallopian Tube, or Primary Perito Specimen Procedure:  Total hysterectomy and bilateral salpingo-oophorectomy;  Omentectomy Right Ovary Integrity:   Capsule intact Left Ovary Integrity:   Capsule ruptured Right Fallopian Tube Integrity:   Serosa intact Left Fallopian Tube Integrity:   Serosa intact Tumor Tumor Site:  Bilateral ovaries Tumor Size:  9 Centimeters (cm) Histologic Type:   High grade serous carcinoma Histologic Grade:   High grade Ovarian Surface Involvement:   Not identified Fallopian Tube Surface Involvement:    Not identified Other Tissue / Organ Involvement:    Pelvic peritoneum;  Abdominal peritoneum Largest Extrapelvic Peritoneal Focus:    Macroscopic (2 cm or less) Peritoneal / Ascitic Fluid Involvement:    Not submitted / unknown Chemotherapy Response Score (CRS):    No known presurgical therapy Regional Lymph Nodes Regional Lymph Node Status:   Not applicable (no regional lymph nodes submitted or found)  pTNM Classification (AJCC 8th Edition) pT Category:   pT3b pN Category:   pN not assigned (no nodes submitted or found) FIGO Stage:  IIIB  Screenings: Colonoscopy: - normal repeat 10 years PAP: 2023- normal Mammo: 2023- abnormal- biopsy negative per pt   GYNHx; h/o 1 abnormal pap in 2019- HPV +, colpo and f/b cryo. h/o fibroid 11 years ago. denies ovarian cyst. LMP 2017 OBHx:  x 3  Labs: CEA = 1.3 CA 19-9 =8  = 518> 609> 771  Family Hx: Sister with breast cancer at 30 alive and well Paternal uncle with kidney cancer  PGF with brain tumor- unsure if it was malignant   Social Hx: Never a smoker Social ETOH use Denies recreartional drugs  with 3 children Works as a RN- FT    [de-identified] : Pt presents today for follow up of serous carcinoma- here for cycle 4, carbo/ taxol.  She had a slight cough which has resolved. Feeling well overall, still exercising and active

## 2024-03-20 NOTE — CONSULT LETTER
[Consult Letter:] : I had the pleasure of evaluating your patient, [unfilled]. [Dear  ___] : Dear ~SHAILESH, [Please see my note below.] : Please see my note below. [Consult Closing:] : Thank you very much for allowing me to participate in the care of this patient.  If you have any questions, please do not hesitate to contact me. [Sincerely,] : Sincerely, [FreeTextEntry3] : Chelsey Lyn MD  of Medicine [facility] of Medicine [facility]

## 2024-03-20 NOTE — REASON FOR VISIT
[Initial Consultation] : an initial consultation [Friend] : friend [Follow-Up Visit] : a follow-up [Family Member] : family member [FreeTextEntry2] : ovarian ca

## 2024-03-21 ENCOUNTER — NON-APPOINTMENT (OUTPATIENT)
Age: 64
End: 2024-03-21

## 2024-04-02 ENCOUNTER — APPOINTMENT (OUTPATIENT)
Dept: HEMATOLOGY ONCOLOGY | Facility: CLINIC | Age: 64
End: 2024-04-02

## 2024-04-03 NOTE — DISCUSSION/SUMMARY
[FreeTextEntry1] : REASON FOR CONSULT  Vanessa Hooper is a 63-year-old female who was referred by Dr. Lyn for cancer genetic counseling and risk assessment due to her personal and family history of cancer and to discuss her genetic test results.     RELEVANT MEDICAL HISTORY  Ms. Hooper was diagnosed with stage 3b high grade serous ovarian cancer in 2023 at age 63. She was treated with total hysterectomy, bilateral salpingo-oophorectomy and omentectomy and resection of diaphragmatic lesions on 2023. She is currently completing chemotherapy with Taxol and carboplatin.    FAMILY HISTORY:  Ms. Hooper has a family history of breast and other cancers, see below.   GENETIC TEST RESULTS:   Ms. Hooper pursued genetic testing using [employer] Common hereditary cancer panel (48 genes) reported on 3/8/2024. The testing was ordered by Dr. Lyn. 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 Of note, [employer] and [facility] are currently classifying this variant as "likely benign".   OTHER MEDICAL AND SURGICAL HISTORY:  History of fibroids. Cryotherapy.  Bilateral fibrocystic breast disease.   PAST OB/GYN HISTORY:  Obstetrical History:   Age at Menarche: 15 Menopausal with LMP at age 57 Age at First Live Birth: 26 Oral Contraceptive Use: Yes, (for 4 years)  Hormone Replacement Therapy: No   CANCER SCREENING HISTORY:    Breast: Having annual mammograms and US. Last screening in 2023 - left breast hemangioma reported and biopsied, reported benign ([facility]). Previous history of left breast biopsy over 10 years ago. Right breast biopsy at least 10 years ago, reported benign (no records). Had one breast MRI over 10 yrs ago due to family history.  GYN: Was having annual pelvic exams. Experienced left abdominal pain, had CT scan which showed acute diverticulitis and showed uterine fibroids in 2023. Referred to GI and Gyn Oncology. TVUS on 10/30/2023 showed left adnexal mass of uncertain etiology, enlarged multifibroid uterus including right fundal pedunculated fibroid on a stalk. MRI of pelvis was recommended and completed on 2023. MRI showed bilateral ovarian masses and findings suspicious for peritoneal carcinomatosis with small volume ascites. Completed JASMYN/BSO at [place] 2023.    Colon: Last colonoscopy reported 2 years ago, no history of polyps. Told to follow up in 10 years.   Skin: Last FBSE in 2023. Follows up annually.       SOCIAL HISTORY:  -	 -	Tobacco-product use: No   FAMILY HISTORY:  Maternal ancestry was reported as English, Polish and [place]an and paternal ancestry was reported as Syrian, Syriac, English and Palauan. A detailed family history of cancer was ascertained. Relevant diagnoses are detailed below and in the scanned pedigree.      Of note, Ms. Hooper's sister had genetic testing with [employer]'s BRCA testing reported 40 years ago which was negative however no report available. Updated genetic testing would be advised for Ms. Hooper's sister.   RESULTS INTERPRETATION AND ASSESSMENT:  At this time the available evidence is insufficient to determine the role of this VUS in disease and the clinical significance of this result is uncertain. Individuals with a pathogenic mutation in MLH1 may have an increased risk for cancers associated with Harp syndrome. It is unknown if the patient has an increased risk for the cancers associated with Harp syndrome at this time.     The detection of this VUS should not currently change the patient's medical management. It is NOT recommended at this time that family members use this result for predictive genetic testing or medical management decisions. With more research, a VUS may be reclassified as either disease-causing or benign. Ms. Hooper was encouraged to contact us every 2-3 years to enquire about any new information for this variant, or sooner if there are any changes in her personal or family history of cancer.  Such updates could possibly change our risk assessment and recommendations.    We also discussed that, while no clear cause of the patient's personal and family history of cancer was identified, this result, while reassuring, does not entirely rule out a hereditary cancer risk in the patient. It is possible, although unlikely, the patient has a mutation in one of the genes tested that is not detectable by this analysis, or has a mutation in a different gene, either known or unknown. It is also possible there is a hereditary cancer predisposition in the family, but the patient did not inherit it.    Given Ms. Hooper's personal and current reported family history of cancer, and her negative genetic test results, the following screening guidelines and risk-reducing recommendations were discussed:    GYN: - These genetic test results do not impact Ms. Hooper's medical management. - Long-term management and surveillance should be based on Ms. Hooper's on- or post-treatment protocol as recommended by her oncology team.   BREAST:   - Ms. Hooper should continue with appropriate breast follow up as per her care providers.   COLON:  - Given the negative genetic test results, we would recommend age-appropriate follow up screening as determined by Ms. Hooper's care team.   OTHER:   - In the absence of other indications, general population cancer screening for other organ systems could be considered at the discretion of Ms. Hooper's care team.  In addition, we discussed Ms. Hooper's sister could consider pursuing updated genetic testing. Ms. Hooper's  could consider pursuing cancer risk assessment with the option of genetic testing given he has a family history of breast cancer.  PLAN:  1.	See above for recommended management.   2.	Testing of family members based on this VUS is not recommended.   3.	The patient was encouraged to contact us every 2-3 years to check on any changes in interpretation of the VUS, or sooner if there are changes in her personal or family history of cancer.  4.	Patient informed consult note(s) will be available through their [employer] [facility] patient portal.   For any additional questions please call Cancer Genetics at [phone].       Destiny De Jesus, Yuan, [facility]  Genetic Counselor, Cancer Genetics      CC:   Vanessa Lyn

## 2024-04-10 ENCOUNTER — LABORATORY RESULT (OUTPATIENT)
Age: 64
End: 2024-04-10

## 2024-04-10 ENCOUNTER — APPOINTMENT (OUTPATIENT)
Dept: HEMATOLOGY ONCOLOGY | Facility: CLINIC | Age: 64
End: 2024-04-10
Payer: COMMERCIAL

## 2024-04-10 ENCOUNTER — RESULT REVIEW (OUTPATIENT)
Age: 64
End: 2024-04-10

## 2024-04-10 VITALS
OXYGEN SATURATION: 98 % | HEIGHT: 62 IN | TEMPERATURE: 97.8 F | BODY MASS INDEX: 27.12 KG/M2 | SYSTOLIC BLOOD PRESSURE: 154 MMHG | HEART RATE: 94 BPM | RESPIRATION RATE: 18 BRPM | WEIGHT: 147.38 LBS | DIASTOLIC BLOOD PRESSURE: 77 MMHG

## 2024-04-10 DIAGNOSIS — R18.8 OTHER ASCITES: ICD-10-CM

## 2024-04-10 PROCEDURE — 36415 COLL VENOUS BLD VENIPUNCTURE: CPT

## 2024-04-10 PROCEDURE — 99214 OFFICE O/P EST MOD 30 MIN: CPT | Mod: 25

## 2024-04-10 NOTE — ASSESSMENT
[FreeTextEntry1] : 63-year-old female presents today for initial consultation of high grade serous carcinoma, FIGO stage IIIB, referred by Dr. Harish Duenas.   Reviewed with patient pathology report, indication for systemic adjuvant chemotherapy with carboplatin and paclitaxel. plan for 6 cycles of chemo - Paclitaxel 175 mg/m2 Q 21 days x 6 - Carboplatin AUC 6 Q 21 days x 6 - Neulasta on pro - Mediport placement - genetic testing. - CT of the chest for staging - HRD testing  Patient offered genetic testing. MLH1 - VUS - referred to genetic counselor   Cycle 2 Taxol carbo- 2/7/24- tolerated cycle 1- some bone pain took Claritin and tylenol with relief  Cycle 3 Taxol/Carbo- 2/28/24- feeling well, since decline in Ca 125, would re image post cycle 6 Cycle 4 - Taxol/ Carbo 3/20/24- continue treatment. Patient with cancer being monitored with weekly CBC, LFT labs for potential adverse effects/toxicity of liver and kidney damage due to cytotoxic chemotherapy. Hypophosphatemia - replacement. Cycle 5 Taxol Carbo 4/10/24- tolerating the treatment, mild neuropathy. PDL addendum to pathology (positive)- to discuss with Dr. Lyn.  Hypophosphatemia - replacement, continue oral.     # Neuropathy- right hand Grade 1  Ca 125- 17   return in 3 weeks reg labs and ca 125 (every 6 weeks) case and mgmt discussed with Dr. Lyn

## 2024-04-10 NOTE — CONSULT LETTER
[Dear  ___] : Dear ~SHAILESH, [Consult Letter:] : I had the pleasure of evaluating your patient, [unfilled]. [Please see my note below.] : Please see my note below. [Consult Closing:] : Thank you very much for allowing me to participate in the care of this patient.  If you have any questions, please do not hesitate to contact me. [Sincerely,] : Sincerely, [FreeTextEntry3] : Chelsey Lyn MD  of Medicine [facility] of Medicine [facility]

## 2024-04-10 NOTE — HISTORY OF PRESENT ILLNESS
[Disease: _____________________] : Disease: [unfilled] [de-identified] : 63 year old female presents today for initial consultation of high grade serous carcinoma, FIGO stage IIIB, referred by Dr. Harish Duenas.  Patient originally presented to ED with abdominal pain and was found to have a lobulated right uterine mass along with diverticulitis.   CT 10/17/23- Findings compatible with acute diverticulitis of the upper sigmoid colon. Cholelithiasis and slight gallbladder wall thickening. No pericholecystic fluid or biliary duct dilation. A lobulated right superior periuterine mass measuring 7 x 5.5 x 7 cm may be a pedunculated myoma, with masses of other etiology not excluded. Follow-up pelvic ultrasound can be performed for further evaluation in this regard.  F/U TC US dated 10/30/23: Enlarged multifibroid uterus including a 7.2 cm dominant right fundal pedunculated fibroid on a stalk. 8.9 cm heterogeneous left adnexal mass of uncertain etiology. Left ovary was not visualized. Pedunculated fibroid versus left adnexal mass is not excluded. Recommend MRI pelvis with and without contrast for further clarification.  MRI 23: Bilateral ovarian masses and findings suspicious for peritoneal carcinomatosis with small volume ascites. In light of the elevated CA-125 levels, primary ovarian malignancy is most likely although differential would include ovarian metastases.  Patient underwent TAHBSO with OMx and resection of diaphragmatic lesions wIth Dr. Moser on 23:  Pathology: Final Diagnosis 1.   Right fallopian tube and ovary - High grade serous carcinoma involving ovary - Benign fallopian tube  2.    Left fallopian tube and ovary - High grade serous carcinoma involving ovary and fallopian tube  3.   Uterus and cervix - Endometrium with small focus of atypical hyperplasia, see note - Myometrium with leiomyoma - Benign cervix  Note:  The entire endometrium has been submitted for microscopic examination.  4.   Omentum - Benign fibroadipose tissue  5.   Right diaphragmatic nodule - High grade serous carcinoma involving fibrous tissue, see note  Note:  The tumor is best seen on the permanent stained slide.  6  Right diaphragmatic nodule #2 - High grade serous carcinoma involving fibrous tissue, see note  Note:  The tumor is best seen on the permanent stained slide.  7.   Nodule over inferior vena cava - High grade serous carcinoma  involving fibrous tissue  Synoptic Summary 2: Ovary, Fallopian Tube, or Primary Perito Specimen Procedure:  Total hysterectomy and bilateral salpingo-oophorectomy;  Omentectomy Right Ovary Integrity:   Capsule intact Left Ovary Integrity:   Capsule ruptured Right Fallopian Tube Integrity:   Serosa intact Left Fallopian Tube Integrity:   Serosa intact Tumor Tumor Site:  Bilateral ovaries Tumor Size:  9 Centimeters (cm) Histologic Type:   High grade serous carcinoma Histologic Grade:   High grade Ovarian Surface Involvement:   Not identified Fallopian Tube Surface Involvement:    Not identified Other Tissue / Organ Involvement:    Pelvic peritoneum;  Abdominal peritoneum Largest Extrapelvic Peritoneal Focus:    Macroscopic (2 cm or less) Peritoneal / Ascitic Fluid Involvement:    Not submitted / unknown Chemotherapy Response Score (CRS):    No known presurgical therapy Regional Lymph Nodes Regional Lymph Node Status:   Not applicable (no regional lymph nodes submitted or found)  pTNM Classification (AJCC 8th Edition) pT Category:   pT3b pN Category:   pN not assigned (no nodes submitted or found) FIGO Stage:  IIIB  Screenings: Colonoscopy: - normal repeat 10 years PAP: 2023- normal Mammo: 2023- abnormal- biopsy negative per pt   GYNHx; h/o 1 abnormal pap in 2019- HPV +, colpo and f/b cryo. h/o fibroid 11 years ago. denies ovarian cyst. 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  She is feeling well- mild neuropathy

## 2024-05-01 ENCOUNTER — RESULT REVIEW (OUTPATIENT)
Age: 64
End: 2024-05-01

## 2024-05-01 ENCOUNTER — APPOINTMENT (OUTPATIENT)
Dept: HEMATOLOGY ONCOLOGY | Facility: CLINIC | Age: 64
End: 2024-05-01
Payer: COMMERCIAL

## 2024-05-01 DIAGNOSIS — E83.39 OTHER DISORDERS OF PHOSPHORUS METABOLISM: ICD-10-CM

## 2024-05-01 PROCEDURE — 99214 OFFICE O/P EST MOD 30 MIN: CPT

## 2024-05-01 PROCEDURE — G2211 COMPLEX E/M VISIT ADD ON: CPT

## 2024-05-01 RX ORDER — MULTIVITAMIN
TABLET ORAL
Refills: 0 | Status: COMPLETED | COMMUNITY
End: 2024-05-01

## 2024-05-01 RX ORDER — METRONIDAZOLE 500 MG/1
500 TABLET ORAL
Qty: 6 | Refills: 0 | Status: COMPLETED | COMMUNITY
Start: 2023-12-05 | End: 2024-05-01

## 2024-05-01 NOTE — END OF VISIT
Chance of  delivery and SGA   Will get 3rd trimester growth    [Time Spent: ___ minutes] : I have spent [unfilled] minutes of time on the encounter. [>50% of the face to face encounter time was spent on counseling and/or coordination of care for ___] : Greater than 50% of the face to face encounter time was spent on counseling and/or coordination of care for [unfilled]

## 2024-05-01 NOTE — CONSULT LETTER
[Dear  ___] : Dear ~SHAILESH, [Consult Letter:] : I had the pleasure of evaluating your patient, [unfilled]. [Please see my note below.] : Please see my note below. [Consult Closing:] : Thank you very much for allowing me to participate in the care of this patient.  If you have any questions, please do not hesitate to contact me. [Sincerely,] : Sincerely, [FreeTextEntry3] : Chelsey Lyn MD  of Medicine [facility] of Medicine [facility]

## 2024-05-01 NOTE — ASSESSMENT
[FreeTextEntry1] : 63-year-old female presents today for initial consultation of high grade serous ovarian carcinoma, FIGO stage IIIB, referred by Dr. Harish Duenas. S/p sx 12/14/2023. HRD negative. Carbo/paclitaxel cycle 1-6 1/2024- 5/1/24 increased fatigue, nausea. PETCT ordered for restaging   Carbo/paclitaxel cycle 1-6 1/2024- 5/1/24 increased fatigue, nausea. PETCT ordered for restaging   Patient offered genetic testing. MLH1 - VUS - referred to genetic counselor   # Neuropathy- right hand Grade 1  Ca 125- 17   return in 3 weeks reg labs and ca 125

## 2024-05-01 NOTE — HISTORY OF PRESENT ILLNESS
[Disease: _____________________] : Disease: [unfilled] [de-identified] : 63 year old female presents today for initial consultation of high grade serous carcinoma, FIGO stage IIIB, referred by Dr. Harish Duenas.  Patient originally presented to ED with abdominal pain and was found to have a lobulated right uterine mass along with diverticulitis.   CT 10/17/23- Findings compatible with acute diverticulitis of the upper sigmoid colon. Cholelithiasis and slight gallbladder wall thickening. No pericholecystic fluid or biliary duct dilation. A lobulated right superior periuterine mass measuring 7 x 5.5 x 7 cm may be a pedunculated myoma, with masses of other etiology not excluded. Follow-up pelvic ultrasound can be performed for further evaluation in this regard.  F/U TC US dated 10/30/23: Enlarged multifibroid uterus including a 7.2 cm dominant right fundal pedunculated fibroid on a stalk. 8.9 cm heterogeneous left adnexal mass of uncertain etiology. Left ovary was not visualized. Pedunculated fibroid versus left adnexal mass is not excluded. Recommend MRI pelvis with and without contrast for further clarification.  MRI 23: Bilateral ovarian masses and findings suspicious for peritoneal carcinomatosis with small volume ascites. In light of the elevated CA-125 levels, primary ovarian malignancy is most likely although differential would include ovarian metastases.  Patient underwent TAHBSO with OMx and resection of diaphragmatic lesions wIth Dr. Moser on 23:  Pathology: Final Diagnosis 1.   Right fallopian tube and ovary - High grade serous carcinoma involving ovary - Benign fallopian tube  2.    Left fallopian tube and ovary - High grade serous carcinoma involving ovary and fallopian tube  3.   Uterus and cervix - Endometrium with small focus of atypical hyperplasia, see note - Myometrium with leiomyoma - Benign cervix  Note:  The entire endometrium has been submitted for microscopic examination.  4.   Omentum - Benign fibroadipose tissue  5.   Right diaphragmatic nodule - High grade serous carcinoma involving fibrous tissue, see note  Note:  The tumor is best seen on the permanent stained slide.  6  Right diaphragmatic nodule #2 - High grade serous carcinoma involving fibrous tissue, see note  Note:  The tumor is best seen on the permanent stained slide.  7.   Nodule over inferior vena cava - High grade serous carcinoma  involving fibrous tissue  Synoptic Summary 2: Ovary, Fallopian Tube, or Primary Perito Specimen Procedure:  Total hysterectomy and bilateral salpingo-oophorectomy;  Omentectomy Right Ovary Integrity:   Capsule intact Left Ovary Integrity:   Capsule ruptured Right Fallopian Tube Integrity:   Serosa intact Left Fallopian Tube Integrity:   Serosa intact Tumor Tumor Site:  Bilateral ovaries Tumor Size:  9 Centimeters (cm) Histologic Type:   High grade serous carcinoma Histologic Grade:   High grade Ovarian Surface Involvement:   Not identified Fallopian Tube Surface Involvement:    Not identified Other Tissue / Organ Involvement:    Pelvic peritoneum;  Abdominal peritoneum Largest Extrapelvic Peritoneal Focus:    Macroscopic (2 cm or less) Peritoneal / Ascitic Fluid Involvement:    Not submitted / unknown Chemotherapy Response Score (CRS):    No known presurgical therapy Regional Lymph Nodes Regional Lymph Node Status:   Not applicable (no regional lymph nodes submitted or found)  pTNM Classification (AJCC 8th Edition) pT Category:   pT3b pN Category:   pN not assigned (no nodes submitted or found) FIGO Stage:  IIIB  Screenings: Colonoscopy: - normal repeat 10 years PAP: 2023- normal Mammo: 2023- abnormal- biopsy negative per pt   GYNHx; h/o 1 abnormal pap in 2019- HPV +, colpo and f/b cryo. h/o fibroid 11 years ago. denies ovarian cyst. 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  Reports feeling fatigue and lack of appitite after last treatment but feeling better now.

## 2024-05-07 ENCOUNTER — NON-APPOINTMENT (OUTPATIENT)
Age: 64
End: 2024-05-07

## 2024-05-09 NOTE — HISTORY OF PRESENT ILLNESS
[FreeTextEntry1] : 64yo here for follow up, Active tx  Dx: Stage 3B HGS ovarian cancer, [employer] and [facility] testing pending Tx: Exlap JASMYN, BSO, OMx and resection of diaphragmatic lesions, optimal R0 resection on 12/13/14 Adjtx: carbo/taxol q3wks (Dr. Lyn) started 1/18/24 to present  Since last visit patient reports doing well. she underwent PDS @ [facility] with Dr. Del Toro and started adjuvant chemo and is s/p C6 on 5/1/24 (Post treatment PET CT is scheduled for 5/16/24). She is tolerated treatment well with minimal SEs/ toxicities. she denies pain/fever/bleeding/bloating. She has normal activity tolerance and normal appetite. Reports normal urination and BMs.  Ca125 preop = 11 (4/10/24), 17 (2/28/24), 92 (1/2024), 608 (11/2023)  Pathology Specimen(s) Submitted 1  Right fallopian tube and ovary 2  Left fallopian tube and ovary 3  Uterus and cervix 4  Omentum 5  Right diaphragmatic nodule 6  Right diaphragmatic nodule #2 7  Nodule over inferior vena cava  Final Diagnosis 1.   Right fallopian tube and ovary - High grade serous carcinoma involving ovary                                      - Benign fallopian tube 2.    Left fallopian tube and ovary - High grade serous carcinoma involving ovary and fallopian tube 3.   Uterus and cervix - Endometrium with small focus of atypical hyperplasia, see note                                    - Myometrium with leiomyoma                                    - Benign cervix Note:  The entire endometrium has been submitted for microscopic examination. 4.   Omentum - Benign fibroadipose tissue 5.   Right diaphragmatic nodule - High grade serous carcinoma involving fibrous tissue, see note Note:  The tumor is best seen on the permanent stained slide. 6  Right diaphragmatic nodule #2 - High grade serous carcinoma involving fibrous tissue, see note Note:  The tumor is best seen on the permanent stained slide. 7.   Nodule over inferior vena cava - High grade serous carcinoma  involving fibrous tissue  Verified by: Gracy Melendez MD _________________________________________________________________ Synoptic Summary 2: Ovary, Fallopian Tube, or Primary Perito Specimen Procedure:  Total hysterectomy and bilateral salpingo-oophorectomy; Omentectomy Right Ovary Integrity:   Capsule intact Left Ovary Integrity:   Capsule ruptured Right Fallopian Tube Integrity:   Serosa intact Left Fallopian Tube Integrity:   Serosa intact Tumor Tumor Site:  Bilateral ovaries Tumor Size:  9 Centimeters (cm) Histologic Type:   High grade serous carcinoma Histologic Grade:   High grade Ovarian Surface Involvement:   Not identified Fallopian Tube Surface Involvement:    Not identified Other Tissue / Organ Involvement:    Pelvic peritoneum;  Abdominal peritoneum  Largest Extrapelvic Peritoneal Focus:    Macroscopic (2 cm or less) Peritoneal / Ascitic Fluid Involvement:    Not submitted / unknown Chemotherapy Response Score (CRS):    No known presurgical therapy Regional Lymph Nodes Regional Lymph Node Status:   Not applicable (no regional lymph nodes submitted or found)  pTNM Classification (AJCC 8th Edition) pT Category:   pT3b pN Category:   pN not assigned (no nodes submitted or found) FIGO Stage FIGO Stage:  IIIB

## 2024-05-09 NOTE — DISCUSSION/SUMMARY
[Reviewed Clinical Lab Test(s)] : Results of clinical tests were reviewed. [Reviewed Radiology Report(s)] : Radiology reports were reviewed. [Discuss Alternatives/Risks/Benefits w/Patient] : All alternatives, risks, and benefits were discussed with the patient/family and all questions were answered.  Patient expressed good understanding and appreciates the importance of follow up as recommended. [Visit Time ___ Minutes] : [unfilled] minutes [Face to Face Time___ Minutes] : with [unfilled] minutes in face to face consultation. [FreeTextEntry1] : 64yo w/ stage 3B HGS ovarian cancer s/p primary debulking surgery, R0 resection. Doing very well. started adjuvant carbo/taxol q3wks, s/p C3. Due for C4 . OK to continue tx as per  medonc. [employer] and G[employer] testing pending. -more than 50% of visit spent face to face with patient counseling and coordinating care -I reviewed diagnosis, stage of disease, standard or care tx plan. Reviewed plan to continue current regimen for total 6 cycles. Reviewed plan to f/u germline and somatic testing and then we can discuss further plan for maintenance therapy with PARPi. -f/u medonc as scheduled; plan for post-tx scan after C6 -rtc 2months -pain/fever/bleeding precautions given

## 2024-05-15 ENCOUNTER — APPOINTMENT (OUTPATIENT)
Dept: GYNECOLOGIC ONCOLOGY | Facility: CLINIC | Age: 64
End: 2024-05-15

## 2024-05-16 ENCOUNTER — RESULT REVIEW (OUTPATIENT)
Age: 64
End: 2024-05-16

## 2024-05-21 ENCOUNTER — NON-APPOINTMENT (OUTPATIENT)
Age: 64
End: 2024-05-21

## 2024-05-22 ENCOUNTER — APPOINTMENT (OUTPATIENT)
Dept: HEMATOLOGY ONCOLOGY | Facility: CLINIC | Age: 64
End: 2024-05-22
Payer: COMMERCIAL

## 2024-05-22 ENCOUNTER — RESULT REVIEW (OUTPATIENT)
Age: 64
End: 2024-05-22

## 2024-05-22 ENCOUNTER — APPOINTMENT (OUTPATIENT)
Dept: GYNECOLOGIC ONCOLOGY | Facility: CLINIC | Age: 64
End: 2024-05-22
Payer: COMMERCIAL

## 2024-05-22 ENCOUNTER — LABORATORY RESULT (OUTPATIENT)
Age: 64
End: 2024-05-22

## 2024-05-22 VITALS
WEIGHT: 150 LBS | SYSTOLIC BLOOD PRESSURE: 136 MMHG | RESPIRATION RATE: 18 BRPM | TEMPERATURE: 97.6 F | BODY MASS INDEX: 27.6 KG/M2 | HEART RATE: 89 BPM | HEIGHT: 62 IN | DIASTOLIC BLOOD PRESSURE: 77 MMHG | OXYGEN SATURATION: 100 %

## 2024-05-22 PROCEDURE — 99214 OFFICE O/P EST MOD 30 MIN: CPT

## 2024-05-22 PROCEDURE — G2211 COMPLEX E/M VISIT ADD ON: CPT

## 2024-05-22 NOTE — HISTORY OF PRESENT ILLNESS
[Disease: _____________________] : Disease: [unfilled] [de-identified] : 63 year old female presents today for initial consultation of high grade serous carcinoma, FIGO stage IIIB, referred by Dr. Harish Duenas.  Patient originally presented to ED with abdominal pain and was found to have a lobulated right uterine mass along with diverticulitis.   CT 10/17/23- Findings compatible with acute diverticulitis of the upper sigmoid colon. Cholelithiasis and slight gallbladder wall thickening. No pericholecystic fluid or biliary duct dilation. A lobulated right superior periuterine mass measuring 7 x 5.5 x 7 cm may be a pedunculated myoma, with masses of other etiology not excluded. Follow-up pelvic ultrasound can be performed for further evaluation in this regard.  F/U TC US dated 10/30/23: Enlarged multifibroid uterus including a 7.2 cm dominant right fundal pedunculated fibroid on a stalk. 8.9 cm heterogeneous left adnexal mass of uncertain etiology. Left ovary was not visualized. Pedunculated fibroid versus left adnexal mass is not excluded. Recommend MRI pelvis with and without contrast for further clarification.  MRI 23: Bilateral ovarian masses and findings suspicious for peritoneal carcinomatosis with small volume ascites. In light of the elevated CA-125 levels, primary ovarian malignancy is most likely although differential would include ovarian metastases.  Patient underwent TAHBSO with OMx and resection of diaphragmatic lesions wIth Dr. Moser on 23:  Pathology: Final Diagnosis 1.   Right fallopian tube and ovary - High grade serous carcinoma involving ovary - Benign fallopian tube  2.    Left fallopian tube and ovary - High grade serous carcinoma involving ovary and fallopian tube  3.   Uterus and cervix - Endometrium with small focus of atypical hyperplasia, see note - Myometrium with leiomyoma - Benign cervix  Note:  The entire endometrium has been submitted for microscopic examination.  4.   Omentum - Benign fibroadipose tissue  5.   Right diaphragmatic nodule - High grade serous carcinoma involving fibrous tissue, see note  Note:  The tumor is best seen on the permanent stained slide.  6  Right diaphragmatic nodule #2 - High grade serous carcinoma involving fibrous tissue, see note  Note:  The tumor is best seen on the permanent stained slide.  7.   Nodule over inferior vena cava - High grade serous carcinoma  involving fibrous tissue  Synoptic Summary 2: Ovary, Fallopian Tube, or Primary Perito Specimen Procedure:  Total hysterectomy and bilateral salpingo-oophorectomy;  Omentectomy Right Ovary Integrity:   Capsule intact Left Ovary Integrity:   Capsule ruptured Right Fallopian Tube Integrity:   Serosa intact Left Fallopian Tube Integrity:   Serosa intact Tumor Tumor Site:  Bilateral ovaries Tumor Size:  9 Centimeters (cm) Histologic Type:   High grade serous carcinoma Histologic Grade:   High grade Ovarian Surface Involvement:   Not identified Fallopian Tube Surface Involvement:    Not identified Other Tissue / Organ Involvement:    Pelvic peritoneum;  Abdominal peritoneum Largest Extrapelvic Peritoneal Focus:    Macroscopic (2 cm or less) Peritoneal / Ascitic Fluid Involvement:    Not submitted / unknown Chemotherapy Response Score (CRS):    No known presurgical therapy Regional Lymph Nodes Regional Lymph Node Status:   Not applicable (no regional lymph nodes submitted or found)  pTNM Classification (AJCC 8th Edition) pT Category:   pT3b pN Category:   pN not assigned (no nodes submitted or found) FIGO Stage:  IIIB  Screenings: Colonoscopy: - normal repeat 10 years PAP: 2023- normal Mammo: 2023- abnormal- biopsy negative per pt   GYNHx; h/o 1 abnormal pap in 2019- HPV +, colpo and f/b cryo. h/o fibroid 11 years ago. denies ovarian cyst. 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 Had PET 5/16/2024 Reports feeling well now. Did have diarrhea after tx, stopped mag and took Imodium and resolved

## 2024-05-22 NOTE — HISTORY OF PRESENT ILLNESS
[FreeTextEntry1] : 62yo , televisit for post-tx follow up  Verbal consent for telehealth visit given on 05/22/2024 by CLARA BACH. Patient location home. Physician location Office, [place], [place]  Dx: Stage 3B HGS ovarian cancer, [employer] negative. NGS = PDL1+, +p53, negative HRD Tx: Exlap JASMYN, BSO, OMx and resection of diaphragmatic lesions, optimal R0 resection on 12/13/14 @ [facility] Adjtx: carbo/taxol q3wks (Dr. Lyn) started 1/18/24 to 5/1/24, total 6 cycles Maintenance therapy: considering PARPi  Since last visit patient reports doing well. she completed chemo 5/1/24 and just had post-tx scan. she denies pain/fever/bleeding/bloating. She has normal activity tolerance and normal appetite on her good days during tx cycle. Reports normal urination and BMs. does report some hot flashes during daytime but not severe and not every day.  Ca125 preop = 608 (11/2023)-->> 92 (1/2024)--> 17 (2/28/24)-> 11 (4/2024)  Pathology Specimen(s) Submitted 1 Right fallopian tube and ovary 2 Left fallopian tube and ovary 3 Uterus and cervix 4 Omentum 5 Right diaphragmatic nodule 6 Right diaphragmatic nodule #2 7 Nodule over inferior vena cava   Final Diagnosis 1. Right fallopian tube and ovary - High grade serous carcinoma involving ovary - Benign fallopian tube  2. Left fallopian tube and ovary - High grade serous carcinoma involving ovary and fallopian tube  3. Uterus and cervix - Endometrium with small focus of atypical hyperplasia, see note - Myometrium with leiomyoma - Benign cervix  Note: The entire endometrium has been submitted for microscopic examination.  4. Omentum - Benign fibroadipose tissue  5. Right diaphragmatic nodule - High grade serous carcinoma involving fibrous tissue, see note  Note: The tumor is best seen on the permanent stained slide.  6 Right diaphragmatic nodule #2 - High grade serous carcinoma involving fibrous tissue, see note  Note: The tumor is best seen on the permanent stained slide.  7. Nodule over inferior vena cava - High grade serous carcinoma involving fibrous tissue  Verified by: Gracy Melendez MD (Electronic Signature) Reported on: 12/28/23 09:48 EST, [facility], [place] [address] Phone: [phone] Fax: [phone] _________________________________________________________________  Synoptic Summary 2: Ovary, Fallopian Tube, or Primary Perito Specimen Procedure: Total hysterectomy and bilateral salpingo-oophorectomy; Omentectomy Right Ovary Integrity: Capsule intact Left Ovary Integrity: Capsule ruptured Right Fallopian Tube Integrity: Serosa intact Left Fallopian Tube Integrity: Serosa intact Tumor Tumor Site: Bilateral ovaries Tumor Size: 9 Centimeters (cm) Histologic Type: High grade serous carcinoma Histologic Grade: High grade Ovarian Surface Involvement: Not identified Fallopian Tube Surface Involvement: Not identified Other Tissue / Organ Involvement: Pelvic peritoneum; Abdominal peritoneum Largest Extrapelvic Peritoneal Focus: Macroscopic (2 cm or less) Peritoneal / Ascitic Fluid Involvement: Not submitted / unknown Chemotherapy Response Score (CRS): No known presurgical therapy Regional Lymph Nodes Regional Lymph Node Status: Not applicable (no regional lymph nodes submitted or found)  pTNM Classification (AJCC 8th Edition) pT Category: pT3b pN Category: pN not assigned (no nodes submitted or found) FIGO Stage FIGO Stage: IIIB  PETCT 5/16/24: FINDINGS:  HEAD/NECK: Physiologic FDG activity in visualized brain, head, and neck.  THORAX: No abnormal FDG activity. No suspicious hypermetabolic lymphadenopathy.  LUNGS: No abnormal FDG activity. No concerning nodule.  PLEURA/PERICARDIUM: No abnormal FDG activity. No effusion.  HEPATOBILIARY/PANCREAS: Physiologic FDG activity.  For reference, normal liver demonstrates SUV mean 2.2.  Cholelithiasis. No evidence of  SPLEEN: Physiologic FDG activity. Normal in size.  ADRENAL GLANDS: No abnormal FDG activity. No nodule.  KIDNEYS/URINARY BLADDER: Physiologic excreted FDG activity.  REPRODUCTIVE ORGANS: Hysterectomy; symmetric tissues at the vaginal cuff.  No suspicious FDG avid tissue in the pelvis.  ABDOMINOPELVIC LYMPH NODES/RETROPERITONEUM: No enlarged or FDG-avid lymph node.  ESOPHAGUS/STOMACH/BOWEL/PERITONEUM/MESENTERY: No abnormal FDG activity. Small hiatal hernia.  Scattered diverticular sacs.  VESSELS: Few scattered atherosclerotic changes in the aorta. No aneurysm.  BONES/SOFT TISSUES: Mildly increased homogeneous activity throughout the osseous structures compatible with reactive marrow. IMPRESSION:  1. S/P hysterectomy. No suspicious FDG avid malignant tissue in the pelvis or elsewhere in the imaging field of view.  2. Cholelithiasis.

## 2024-05-22 NOTE — CONSULT LETTER
[Dear  ___] : Dear ~SHAILESH, [Consult Letter:] : I had the pleasure of evaluating your patient, [unfilled]. [Please see my note below.] : Please see my note below. [Consult Closing:] : Thank you very much for allowing me to participate in the care of this patient.  If you have any questions, please do not hesitate to contact me. [Sincerely,] : Sincerely, [FreeTextEntry3] : Chelsey Lyn MD  of Medicine [facility] of Medicine [facility]

## 2024-05-22 NOTE — DISCUSSION/SUMMARY
[Reviewed Clinical Lab Test(s)] : Results of clinical tests were reviewed. [Reviewed Radiology Report(s)] : Radiology reports were reviewed. [Reviewed Radiology Film/Image(s)] : Images from radiology studies were reviewed and examined. [Discuss Alternatives/Risks/Benefits w/Patient] : All alternatives, risks, and benefits were discussed with the patient/family and all questions were answered.  Patient expressed good understanding and appreciates the importance of follow up as recommended. [Visit Time ___ Minutes] : [unfilled] minutes [FreeTextEntry1] : 62yo w/ stage 3B HGS ovarian cancer, germline and somatic testing negative, s/p primary debulking surgery, R0 resection. s/p 6 cycles carbo/taxol , post-tx scan RAJNI and ca125 WNL. Patient is candidate for parpi maintenance with niraparib -entire visit spent counseling patient and coordinating care/communicating with members of care team -I reviewed diagnosis, stage of disease, treatment course and excellent response. reviewed natural hx of this disease and high risk for recurrence. we reviewed data on PARPi maintenance therapy and current indication that approves Niraparib for all comers as maintenance therapy after front-line tx regardless of germline/somatic testing status. SEs and toxicities reviewed by me and with medonc. Patient will start PARPi therapy as per medonc and pending labs -will continue surveillance ca125 and q3 months scans as per medonc -pt to continue f/u with medonc and PCP -rtc 3months in person visit with me -pain/fever/bleeding precautions given.

## 2024-05-22 NOTE — ASSESSMENT
[FreeTextEntry1] : 63-year-old female presents today for initial consultation of high grade serous ovarian carcinoma, FIGO stage IIIB, referred by Dr. Harish Duenas. S/p sx 12/14/2023. HRD negative. Carbo/paclitaxel cycle 1-6 1/2024- 5/1/24 increased fatigue, nausea. PETCT ordered for restaging   Carbo/paclitaxel cycle 1-6 1/2024- 5/1/24 PETCT - 5/24- RAJNI Reviewed pathology - HRD negative, PDL-1 expression 6. 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

## 2024-05-23 RX ORDER — OLAPARIB 150 MG/1
150 TABLET, FILM COATED ORAL
Qty: 120 | Refills: 6 | Status: DISCONTINUED | COMMUNITY
Start: 2024-05-22 | End: 2024-05-23

## 2024-06-12 RX ORDER — NIRAPARIB 200 MG/1
200 TABLET, FILM COATED ORAL
Qty: 30 | Refills: 5 | Status: ACTIVE | COMMUNITY
Start: 2024-05-23 | End: 1900-01-01

## 2024-06-19 ENCOUNTER — APPOINTMENT (OUTPATIENT)
Dept: HEMATOLOGY ONCOLOGY | Facility: CLINIC | Age: 64
End: 2024-06-19
Payer: COMMERCIAL

## 2024-06-19 ENCOUNTER — RESULT REVIEW (OUTPATIENT)
Age: 64
End: 2024-06-19

## 2024-06-19 ENCOUNTER — LABORATORY RESULT (OUTPATIENT)
Age: 64
End: 2024-06-19

## 2024-06-19 VITALS
BODY MASS INDEX: 27.42 KG/M2 | OXYGEN SATURATION: 99 % | DIASTOLIC BLOOD PRESSURE: 83 MMHG | SYSTOLIC BLOOD PRESSURE: 146 MMHG | HEIGHT: 62 IN | WEIGHT: 149 LBS | HEART RATE: 93 BPM | TEMPERATURE: 97.8 F | RESPIRATION RATE: 18 BRPM

## 2024-06-19 DIAGNOSIS — T45.1X5A DRUG-INDUCED POLYNEUROPATHY: ICD-10-CM

## 2024-06-19 DIAGNOSIS — G62.9 POLYNEUROPATHY, UNSPECIFIED: ICD-10-CM

## 2024-06-19 DIAGNOSIS — G62.0 DRUG-INDUCED POLYNEUROPATHY: ICD-10-CM

## 2024-06-19 DIAGNOSIS — C57.4 MALIGNANT NEOPLASM OF UTERINE ADNEXA, UNSPECIFIED: ICD-10-CM

## 2024-06-19 DIAGNOSIS — C78.6 SECONDARY MALIGNANT NEOPLASM OF RETROPERITONEUM AND PERITONEUM: ICD-10-CM

## 2024-06-19 DIAGNOSIS — R97.1 ELEVATED CANCER ANTIGEN 125 [CA 125]: ICD-10-CM

## 2024-06-19 PROCEDURE — 36415 COLL VENOUS BLD VENIPUNCTURE: CPT

## 2024-06-19 PROCEDURE — 99214 OFFICE O/P EST MOD 30 MIN: CPT

## 2024-06-19 RX ORDER — OLANZAPINE 2.5 MG/1
2.5 TABLET, FILM COATED ORAL
Qty: 30 | Refills: 2 | Status: COMPLETED | COMMUNITY
Start: 2024-01-03 | End: 2024-06-19

## 2024-06-19 RX ORDER — DEXAMETHASONE 4 MG/1
4 TABLET ORAL
Qty: 20 | Refills: 2 | Status: COMPLETED | COMMUNITY
Start: 2024-01-03 | End: 2024-06-19

## 2024-06-19 RX ORDER — DIBASIC SODIUM PHOSPHATE, MONOBASIC POTASSIUM PHOSPHATE AND MONOBASIC SODIUM PHOSPHATE 852; 155; 130 MG/1; MG/1; MG/1
155-852-130 TABLET ORAL
Qty: 60 | Refills: 1 | Status: COMPLETED | COMMUNITY
Start: 2024-03-20 | End: 2024-06-19

## 2024-06-19 NOTE — REVIEW OF SYSTEMS
[Diarrhea: Grade 0] : Diarrhea: Grade 0 [Negative] : Allergic/Immunologic [Abdominal Pain] : no abdominal pain [Vomiting] : no vomiting [de-identified] : neuropathy intoes improving. Fingers almost resolved

## 2024-06-19 NOTE — CONSULT LETTER
[Dear  ___] : Dear ~SHAILESH, [Consult Letter:] : I had the pleasure of evaluating your patient, [unfilled]. [Please see my note below.] : Please see my note below. [Consult Closing:] : Thank you very much for allowing me to participate in the care of this patient.  If you have any questions, please do not hesitate to contact me. [Sincerely,] : Sincerely, [FreeTextEntry3] : Chelsey Lyn MD  of Medicine [facility] of Medicine [facility]

## 2024-06-19 NOTE — ASSESSMENT
[FreeTextEntry1] : 63-year-old female presents today for initial consultation of high grade serous ovarian carcinoma, FIGO stage IIIB, referred by Dr. Harish Duenas. S/p sx 12/14/2023. HRD negative. Carbo/paclitaxel cycle 1-6 1/2024- 5/1/24 increased fatigue, nausea. PETCT ordered for restaging   Carbo/paclitaxel cycle 1-6 1/2024- 5/1/24 PETCT - 5/24- RAJNI Reviewed pathology - HRD negative, PDL-1 expression 6. TP53 83% allele  D/w patient data for Olaparib, monitoring side effects, rx send 300 mg PO BID> changed to Zejula - patient started taking Zejula 5/28/24 - noted decline in WBC and platelets, safe parameters -BP/HR - WNL -  dysgeusia reported  in 13%  - check Mg - counts surveillance in 4 weeks  Ca 125- 17> 11> 9   Genetic MLH1 - VUS - genetic testing for daughters  # Neuropathy- right hand Grade 1 - encouraged to do OT, reviewed exercises   - improving  # return in 4 weeks reg labs and ca 125

## 2024-06-19 NOTE — HISTORY OF PRESENT ILLNESS
[Disease: _____________________] : Disease: [unfilled] [de-identified] : 63 year old female presents today for initial consultation of high grade serous carcinoma, FIGO stage IIIB, referred by Dr. Harish Duenas.  Patient originally presented to ED with abdominal pain and was found to have a lobulated right uterine mass along with diverticulitis.   CT 10/17/23- Findings compatible with acute diverticulitis of the upper sigmoid colon. Cholelithiasis and slight gallbladder wall thickening. No pericholecystic fluid or biliary duct dilation. A lobulated right superior periuterine mass measuring 7 x 5.5 x 7 cm may be a pedunculated myoma, with masses of other etiology not excluded. Follow-up pelvic ultrasound can be performed for further evaluation in this regard.  F/U TC US dated 10/30/23: Enlarged multifibroid uterus including a 7.2 cm dominant right fundal pedunculated fibroid on a stalk. 8.9 cm heterogeneous left adnexal mass of uncertain etiology. Left ovary was not visualized. Pedunculated fibroid versus left adnexal mass is not excluded. Recommend MRI pelvis with and without contrast for further clarification.  MRI 23: Bilateral ovarian masses and findings suspicious for peritoneal carcinomatosis with small volume ascites. In light of the elevated CA-125 levels, primary ovarian malignancy is most likely although differential would include ovarian metastases.  Patient underwent TAHBSO with OMx and resection of diaphragmatic lesions wIth Dr. Moser on 23:  Pathology: Final Diagnosis 1.   Right fallopian tube and ovary - High grade serous carcinoma involving ovary - Benign fallopian tube  2.    Left fallopian tube and ovary - High grade serous carcinoma involving ovary and fallopian tube  3.   Uterus and cervix - Endometrium with small focus of atypical hyperplasia, see note - Myometrium with leiomyoma - Benign cervix  Note:  The entire endometrium has been submitted for microscopic examination.  4.   Omentum - Benign fibroadipose tissue  5.   Right diaphragmatic nodule - High grade serous carcinoma involving fibrous tissue, see note  Note:  The tumor is best seen on the permanent stained slide.  6  Right diaphragmatic nodule #2 - High grade serous carcinoma involving fibrous tissue, see note  Note:  The tumor is best seen on the permanent stained slide.  7.   Nodule over inferior vena cava - High grade serous carcinoma  involving fibrous tissue  Synoptic Summary 2: Ovary, Fallopian Tube, or Primary Perito Specimen Procedure:  Total hysterectomy and bilateral salpingo-oophorectomy;  Omentectomy Right Ovary Integrity:   Capsule intact Left Ovary Integrity:   Capsule ruptured Right Fallopian Tube Integrity:   Serosa intact Left Fallopian Tube Integrity:   Serosa intact Tumor Tumor Site:  Bilateral ovaries Tumor Size:  9 Centimeters (cm) Histologic Type:   High grade serous carcinoma Histologic Grade:   High grade Ovarian Surface Involvement:   Not identified Fallopian Tube Surface Involvement:    Not identified Other Tissue / Organ Involvement:    Pelvic peritoneum;  Abdominal peritoneum Largest Extrapelvic Peritoneal Focus:    Macroscopic (2 cm or less) Peritoneal / Ascitic Fluid Involvement:    Not submitted / unknown Chemotherapy Response Score (CRS):    No known presurgical therapy Regional Lymph Nodes Regional Lymph Node Status:   Not applicable (no regional lymph nodes submitted or found)  pTNM Classification (AJCC 8th Edition) pT Category:   pT3b pN Category:   pN not assigned (no nodes submitted or found) FIGO Stage:  IIIB  Screenings: Colonoscopy: - normal repeat 10 years PAP: 2023- normal Mammo: 2023- abnormal- biopsy negative per pt   GYNHx; h/o 1 abnormal pap in 2019- HPV +, colpo and f/b cryo. h/o fibroid 11 years ago. denies ovarian cyst. 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 Started Zejula 5/28. Reports having trouble with appetite at first, but now improving. Reports a metalic taste in her mouth. Otherwise feeling well  PET 5/16/2024

## 2024-07-11 ENCOUNTER — APPOINTMENT (OUTPATIENT)
Dept: HEART AND VASCULAR | Facility: CLINIC | Age: 64
End: 2024-07-11
Payer: COMMERCIAL

## 2024-07-11 ENCOUNTER — NON-APPOINTMENT (OUTPATIENT)
Age: 64
End: 2024-07-11

## 2024-07-11 VITALS
OXYGEN SATURATION: 96 % | BODY MASS INDEX: 27.23 KG/M2 | HEIGHT: 62 IN | WEIGHT: 148 LBS | DIASTOLIC BLOOD PRESSURE: 84 MMHG | SYSTOLIC BLOOD PRESSURE: 152 MMHG | HEART RATE: 120 BPM

## 2024-07-11 DIAGNOSIS — R94.31 ABNORMAL ELECTROCARDIOGRAM [ECG] [EKG]: ICD-10-CM

## 2024-07-11 DIAGNOSIS — R06.02 SHORTNESS OF BREATH: ICD-10-CM

## 2024-07-11 PROCEDURE — 99204 OFFICE O/P NEW MOD 45 MIN: CPT | Mod: 25

## 2024-07-11 PROCEDURE — 93000 ELECTROCARDIOGRAM COMPLETE: CPT

## 2024-07-12 LAB
CHOLEST SERPL-MCNC: 199 MG/DL
HDLC SERPL-MCNC: 60 MG/DL
LDLC SERPL CALC-MCNC: 115 MG/DL
NONHDLC SERPL-MCNC: 139 MG/DL
TRIGL SERPL-MCNC: 134 MG/DL

## 2024-07-24 ENCOUNTER — LABORATORY RESULT (OUTPATIENT)
Age: 64
End: 2024-07-24

## 2024-07-24 ENCOUNTER — RESULT REVIEW (OUTPATIENT)
Age: 64
End: 2024-07-24

## 2024-07-24 ENCOUNTER — APPOINTMENT (OUTPATIENT)
Dept: HEMATOLOGY ONCOLOGY | Facility: CLINIC | Age: 64
End: 2024-07-24
Payer: COMMERCIAL

## 2024-07-24 VITALS
WEIGHT: 150.2 LBS | TEMPERATURE: 98.2 F | BODY MASS INDEX: 27.64 KG/M2 | OXYGEN SATURATION: 97 % | DIASTOLIC BLOOD PRESSURE: 81 MMHG | HEART RATE: 93 BPM | SYSTOLIC BLOOD PRESSURE: 137 MMHG | HEIGHT: 62 IN | RESPIRATION RATE: 18 BRPM

## 2024-07-24 DIAGNOSIS — C57.4 MALIGNANT NEOPLASM OF UTERINE ADNEXA, UNSPECIFIED: ICD-10-CM

## 2024-07-24 DIAGNOSIS — C78.6 SECONDARY MALIGNANT NEOPLASM OF RETROPERITONEUM AND PERITONEUM: ICD-10-CM

## 2024-07-24 PROCEDURE — 99213 OFFICE O/P EST LOW 20 MIN: CPT

## 2024-07-24 PROCEDURE — 36415 COLL VENOUS BLD VENIPUNCTURE: CPT

## 2024-07-24 NOTE — REVIEW OF SYSTEMS
[Diarrhea: Grade 0] : Diarrhea: Grade 0 [Negative] : Allergic/Immunologic [Abdominal Pain] : no abdominal pain [Vomiting] : no vomiting [de-identified] : neuropathy intoes improving. Fingers almost resolved

## 2024-07-24 NOTE — HISTORY OF PRESENT ILLNESS
[Disease: _____________________] : Disease: [unfilled] [de-identified] : 63 year old female presents today for initial consultation of high grade serous carcinoma, FIGO stage IIIB, referred by Dr. Harish Duenas.  Patient originally presented to ED with abdominal pain and was found to have a lobulated right uterine mass along with diverticulitis.   CT 10/17/23- Findings compatible with acute diverticulitis of the upper sigmoid colon. Cholelithiasis and slight gallbladder wall thickening. No pericholecystic fluid or biliary duct dilation. A lobulated right superior periuterine mass measuring 7 x 5.5 x 7 cm may be a pedunculated myoma, with masses of other etiology not excluded. Follow-up pelvic ultrasound can be performed for further evaluation in this regard.  F/U TC US dated 10/30/23: Enlarged multifibroid uterus including a 7.2 cm dominant right fundal pedunculated fibroid on a stalk. 8.9 cm heterogeneous left adnexal mass of uncertain etiology. Left ovary was not visualized. Pedunculated fibroid versus left adnexal mass is not excluded. Recommend MRI pelvis with and without contrast for further clarification.  MRI 23: Bilateral ovarian masses and findings suspicious for peritoneal carcinomatosis with small volume ascites. In light of the elevated CA-125 levels, primary ovarian malignancy is most likely although differential would include ovarian metastases.  Patient underwent TAHBSO with OMx and resection of diaphragmatic lesions wIth Dr. Moser on 23:  Pathology: Final Diagnosis 1.   Right fallopian tube and ovary - High grade serous carcinoma involving ovary - Benign fallopian tube  2.    Left fallopian tube and ovary - High grade serous carcinoma involving ovary and fallopian tube  3.   Uterus and cervix - Endometrium with small focus of atypical hyperplasia, see note - Myometrium with leiomyoma - Benign cervix  Note:  The entire endometrium has been submitted for microscopic examination.  4.   Omentum - Benign fibroadipose tissue  5.   Right diaphragmatic nodule - High grade serous carcinoma involving fibrous tissue, see note  Note:  The tumor is best seen on the permanent stained slide.  6  Right diaphragmatic nodule #2 - High grade serous carcinoma involving fibrous tissue, see note  Note:  The tumor is best seen on the permanent stained slide.  7.   Nodule over inferior vena cava - High grade serous carcinoma  involving fibrous tissue  Synoptic Summary 2: Ovary, Fallopian Tube, or Primary Perito Specimen Procedure:  Total hysterectomy and bilateral salpingo-oophorectomy;  Omentectomy Right Ovary Integrity:   Capsule intact Left Ovary Integrity:   Capsule ruptured Right Fallopian Tube Integrity:   Serosa intact Left Fallopian Tube Integrity:   Serosa intact Tumor Tumor Site:  Bilateral ovaries Tumor Size:  9 Centimeters (cm) Histologic Type:   High grade serous carcinoma Histologic Grade:   High grade Ovarian Surface Involvement:   Not identified Fallopian Tube Surface Involvement:    Not identified Other Tissue / Organ Involvement:    Pelvic peritoneum;  Abdominal peritoneum Largest Extrapelvic Peritoneal Focus:    Macroscopic (2 cm or less) Peritoneal / Ascitic Fluid Involvement:    Not submitted / unknown Chemotherapy Response Score (CRS):    No known presurgical therapy Regional Lymph Nodes Regional Lymph Node Status:   Not applicable (no regional lymph nodes submitted or found)  pTNM Classification (AJCC 8th Edition) pT Category:   pT3b pN Category:   pN not assigned (no nodes submitted or found) FIGO Stage:  IIIB  Screenings: Colonoscopy: - normal repeat 10 years PAP: 2023- normal Mammo: 2023- abnormal- biopsy negative per pt   GYNHx; h/o 1 abnormal pap in 2019- HPV +, colpo and f/b cryo. h/o fibroid 11 years ago. denies ovarian cyst. 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 Started Zejula 5/28. Reports having trouble with appetite at first, but now improving. Reports a metalic taste in her mouth. Otherwise feeling well  PET 5/16/2024

## 2024-07-24 NOTE — ASSESSMENT
[FreeTextEntry1] : 63-year-old female presents today for initial consultation of high grade serous ovarian carcinoma, FIGO stage IIIB, referred by Dr. Harish Duenas. S/p sx 12/14/2023. HRD negative. Carbo/paclitaxel cycle 1-6 1/2024- 5/1/24 increased fatigue, nausea. PETCT ordered for restaging   Carbo/paclitaxel cycle 1-6 1/2024- 5/1/24 PETCT - 5/24- RAJNI- plan for re imaging end of Aug 2024 Reviewed pathology - HRD negative, PDL-1 expression 6. 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  Case and mgmt discussed with Dr. Lyn

## 2024-07-24 NOTE — CONSULT LETTER
[Dear  ___] : Dear ~SHAILESH, [Consult Letter:] : I had the pleasure of evaluating your patient, [unfilled]. [Please see my note below.] : Please see my note below. [Consult Closing:] : Thank you very much for allowing me to participate in the care of this patient.  If you have any questions, please do not hesitate to contact me. [Sincerely,] : Sincerely, [FreeTextEntry3] : Chelsey Lyn MD  of Medicine [facility] of Medicine [facility]

## 2024-07-24 NOTE — REVIEW OF SYSTEMS
[Diarrhea: Grade 0] : Diarrhea: Grade 0 [Negative] : Allergic/Immunologic [Abdominal Pain] : no abdominal pain [Vomiting] : no vomiting [de-identified] : neuropathy intoes improving. Fingers almost resolved

## 2024-07-24 NOTE — REVIEW OF SYSTEMS
[Diarrhea: Grade 0] : Diarrhea: Grade 0 [Negative] : Allergic/Immunologic [Abdominal Pain] : no abdominal pain [Vomiting] : no vomiting [de-identified] : neuropathy intoes improving. Fingers almost resolved

## 2024-07-24 NOTE — HISTORY OF PRESENT ILLNESS
[Disease: _____________________] : Disease: [unfilled] [de-identified] : 63 year old female presents today for initial consultation of high grade serous carcinoma, FIGO stage IIIB, referred by Dr. Harish Duenas.  Patient originally presented to ED with abdominal pain and was found to have a lobulated right uterine mass along with diverticulitis.   CT 10/17/23- Findings compatible with acute diverticulitis of the upper sigmoid colon. Cholelithiasis and slight gallbladder wall thickening. No pericholecystic fluid or biliary duct dilation. A lobulated right superior periuterine mass measuring 7 x 5.5 x 7 cm may be a pedunculated myoma, with masses of other etiology not excluded. Follow-up pelvic ultrasound can be performed for further evaluation in this regard.  F/U TC US dated 10/30/23: Enlarged multifibroid uterus including a 7.2 cm dominant right fundal pedunculated fibroid on a stalk. 8.9 cm heterogeneous left adnexal mass of uncertain etiology. Left ovary was not visualized. Pedunculated fibroid versus left adnexal mass is not excluded. Recommend MRI pelvis with and without contrast for further clarification.  MRI 23: Bilateral ovarian masses and findings suspicious for peritoneal carcinomatosis with small volume ascites. In light of the elevated CA-125 levels, primary ovarian malignancy is most likely although differential would include ovarian metastases.  Patient underwent TAHBSO with OMx and resection of diaphragmatic lesions wIth Dr. Moser on 23:  Pathology: Final Diagnosis 1.   Right fallopian tube and ovary - High grade serous carcinoma involving ovary - Benign fallopian tube  2.    Left fallopian tube and ovary - High grade serous carcinoma involving ovary and fallopian tube  3.   Uterus and cervix - Endometrium with small focus of atypical hyperplasia, see note - Myometrium with leiomyoma - Benign cervix  Note:  The entire endometrium has been submitted for microscopic examination.  4.   Omentum - Benign fibroadipose tissue  5.   Right diaphragmatic nodule - High grade serous carcinoma involving fibrous tissue, see note  Note:  The tumor is best seen on the permanent stained slide.  6  Right diaphragmatic nodule #2 - High grade serous carcinoma involving fibrous tissue, see note  Note:  The tumor is best seen on the permanent stained slide.  7.   Nodule over inferior vena cava - High grade serous carcinoma  involving fibrous tissue  Synoptic Summary 2: Ovary, Fallopian Tube, or Primary Perito Specimen Procedure:  Total hysterectomy and bilateral salpingo-oophorectomy;  Omentectomy Right Ovary Integrity:   Capsule intact Left Ovary Integrity:   Capsule ruptured Right Fallopian Tube Integrity:   Serosa intact Left Fallopian Tube Integrity:   Serosa intact Tumor Tumor Site:  Bilateral ovaries Tumor Size:  9 Centimeters (cm) Histologic Type:   High grade serous carcinoma Histologic Grade:   High grade Ovarian Surface Involvement:   Not identified Fallopian Tube Surface Involvement:    Not identified Other Tissue / Organ Involvement:    Pelvic peritoneum;  Abdominal peritoneum Largest Extrapelvic Peritoneal Focus:    Macroscopic (2 cm or less) Peritoneal / Ascitic Fluid Involvement:    Not submitted / unknown Chemotherapy Response Score (CRS):    No known presurgical therapy Regional Lymph Nodes Regional Lymph Node Status:   Not applicable (no regional lymph nodes submitted or found)  pTNM Classification (AJCC 8th Edition) pT Category:   pT3b pN Category:   pN not assigned (no nodes submitted or found) FIGO Stage:  IIIB  Screenings: Colonoscopy: - normal repeat 10 years PAP: 2023- normal Mammo: 2023- abnormal- biopsy negative per pt   GYNHx; h/o 1 abnormal pap in 2019- HPV +, colpo and f/b cryo. h/o fibroid 11 years ago. denies ovarian cyst. 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 Started Zejula 5/28. Reports having trouble with appetite at first, but now improving. Reports a metalic taste in her mouth. Otherwise feeling well  PET 5/16/2024

## 2024-07-24 NOTE — CONSULT LETTER
[Dear  ___] : Dear ~SHAILESH, [Consult Letter:] : I had the pleasure of evaluating your patient, [unfilled]. [Please see my note below.] : Please see my note below. [Consult Closing:] : Thank you very much for allowing me to participate in the care of this patient.  If you have any questions, please do not hesitate to contact me. [Sincerely,] : Sincerely, [FreeTextEntry3] : Chelsey Lyn MD  of Medicine [facility] of Medicine [facility]

## 2024-07-24 NOTE — BEGINNING OF VISIT
[0] : 2) Feeling down, depressed, or hopeless: Not at all (0) [PHQ-2 Negative] : PHQ-2 Negative [[id number]] : 0 [Never] : Never [Patient/Caregiver unclear of wishes] : Patient/Caregiver unclear of wishes [Diarrhea Character] : Diarrhea: Grade 0

## 2024-07-24 NOTE — HISTORY OF PRESENT ILLNESS
[Disease: _____________________] : Disease: [unfilled] [de-identified] : 63 year old female presents today for initial consultation of high grade serous carcinoma, FIGO stage IIIB, referred by Dr. Harish Duenas.  Patient originally presented to ED with abdominal pain and was found to have a lobulated right uterine mass along with diverticulitis.   CT 10/17/23- Findings compatible with acute diverticulitis of the upper sigmoid colon. Cholelithiasis and slight gallbladder wall thickening. No pericholecystic fluid or biliary duct dilation. A lobulated right superior periuterine mass measuring 7 x 5.5 x 7 cm may be a pedunculated myoma, with masses of other etiology not excluded. Follow-up pelvic ultrasound can be performed for further evaluation in this regard.  F/U TC US dated 10/30/23: Enlarged multifibroid uterus including a 7.2 cm dominant right fundal pedunculated fibroid on a stalk. 8.9 cm heterogeneous left adnexal mass of uncertain etiology. Left ovary was not visualized. Pedunculated fibroid versus left adnexal mass is not excluded. Recommend MRI pelvis with and without contrast for further clarification.  MRI 23: Bilateral ovarian masses and findings suspicious for peritoneal carcinomatosis with small volume ascites. In light of the elevated CA-125 levels, primary ovarian malignancy is most likely although differential would include ovarian metastases.  Patient underwent TAHBSO with OMx and resection of diaphragmatic lesions wIth Dr. Moser on 23:  Pathology: Final Diagnosis 1.   Right fallopian tube and ovary - High grade serous carcinoma involving ovary - Benign fallopian tube  2.    Left fallopian tube and ovary - High grade serous carcinoma involving ovary and fallopian tube  3.   Uterus and cervix - Endometrium with small focus of atypical hyperplasia, see note - Myometrium with leiomyoma - Benign cervix  Note:  The entire endometrium has been submitted for microscopic examination.  4.   Omentum - Benign fibroadipose tissue  5.   Right diaphragmatic nodule - High grade serous carcinoma involving fibrous tissue, see note  Note:  The tumor is best seen on the permanent stained slide.  6  Right diaphragmatic nodule #2 - High grade serous carcinoma involving fibrous tissue, see note  Note:  The tumor is best seen on the permanent stained slide.  7.   Nodule over inferior vena cava - High grade serous carcinoma  involving fibrous tissue  Synoptic Summary 2: Ovary, Fallopian Tube, or Primary Perito Specimen Procedure:  Total hysterectomy and bilateral salpingo-oophorectomy;  Omentectomy Right Ovary Integrity:   Capsule intact Left Ovary Integrity:   Capsule ruptured Right Fallopian Tube Integrity:   Serosa intact Left Fallopian Tube Integrity:   Serosa intact Tumor Tumor Site:  Bilateral ovaries Tumor Size:  9 Centimeters (cm) Histologic Type:   High grade serous carcinoma Histologic Grade:   High grade Ovarian Surface Involvement:   Not identified Fallopian Tube Surface Involvement:    Not identified Other Tissue / Organ Involvement:    Pelvic peritoneum;  Abdominal peritoneum Largest Extrapelvic Peritoneal Focus:    Macroscopic (2 cm or less) Peritoneal / Ascitic Fluid Involvement:    Not submitted / unknown Chemotherapy Response Score (CRS):    No known presurgical therapy Regional Lymph Nodes Regional Lymph Node Status:   Not applicable (no regional lymph nodes submitted or found)  pTNM Classification (AJCC 8th Edition) pT Category:   pT3b pN Category:   pN not assigned (no nodes submitted or found) FIGO Stage:  IIIB  Screenings: Colonoscopy: - normal repeat 10 years PAP: 2023- normal Mammo: 2023- abnormal- biopsy negative per pt   GYNHx; h/o 1 abnormal pap in 2019- HPV +, colpo and f/b cryo. h/o fibroid 11 years ago. denies ovarian cyst. 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 Started Zejula 5/28. Reports having trouble with appetite at first, but now improving. Reports a metalic taste in her mouth. Otherwise feeling well  PET 5/16/2024

## 2024-07-24 NOTE — BEGINNING OF VISIT
[0] : 2) Feeling down, depressed, or hopeless: Not at all (0) [PHQ-2 Negative] : PHQ-2 Negative [[id number]] : 0 [Never] : Never [Patient/Caregiver unclear of wishes] : Patient/Caregiver unclear of wishes [Diarrhea Character] : Diarrhea: Grade 0

## 2024-07-24 NOTE — BEGINNING OF VISIT
[0] : 2) Feeling down, depressed, or hopeless: Not at all (0) [PHQ-2 Negative] : PHQ-2 Negative [[id number]] : 0 [Never] : Never [Patient/Caregiver unclear of wishes] : Patient/Caregiver unclear of wishes [Diarrhea Character] : Diarrhea: Grade 0

## 2024-07-24 NOTE — CONSULT LETTER
[Dear  ___] : Dear ~SHAILESH, [Consult Letter:] : I had the pleasure of evaluating your patient, [unfilled]. [Please see my note below.] : Please see my note below. [Consult Closing:] : Thank you very much for allowing me to participate in the care of this patient.  If you have any questions, please do not hesitate to contact me. [Sincerely,] : Sincerely, [FreeTextEntry3] : Chelsey Lyn MD  of Medicine [facility] of Medicine [facility]

## 2024-08-21 ENCOUNTER — LABORATORY RESULT (OUTPATIENT)
Age: 64
End: 2024-08-21

## 2024-08-21 ENCOUNTER — APPOINTMENT (OUTPATIENT)
Dept: HEMATOLOGY ONCOLOGY | Facility: CLINIC | Age: 64
End: 2024-08-21
Payer: COMMERCIAL

## 2024-08-21 ENCOUNTER — RESULT REVIEW (OUTPATIENT)
Age: 64
End: 2024-08-21

## 2024-08-21 ENCOUNTER — APPOINTMENT (OUTPATIENT)
Dept: GYNECOLOGIC ONCOLOGY | Facility: CLINIC | Age: 64
End: 2024-08-21
Payer: COMMERCIAL

## 2024-08-21 VITALS
TEMPERATURE: 98.6 F | OXYGEN SATURATION: 99 % | WEIGHT: 152.7 LBS | BODY MASS INDEX: 28.1 KG/M2 | RESPIRATION RATE: 18 BRPM | HEART RATE: 95 BPM | HEIGHT: 62 IN | DIASTOLIC BLOOD PRESSURE: 76 MMHG | SYSTOLIC BLOOD PRESSURE: 148 MMHG

## 2024-08-21 DIAGNOSIS — G62.0 DRUG-INDUCED POLYNEUROPATHY: ICD-10-CM

## 2024-08-21 DIAGNOSIS — C57.4 MALIGNANT NEOPLASM OF UTERINE ADNEXA, UNSPECIFIED: ICD-10-CM

## 2024-08-21 DIAGNOSIS — T45.1X5A DRUG-INDUCED POLYNEUROPATHY: ICD-10-CM

## 2024-08-21 DIAGNOSIS — C78.6 SECONDARY MALIGNANT NEOPLASM OF RETROPERITONEUM AND PERITONEUM: ICD-10-CM

## 2024-08-21 PROCEDURE — 99459 PELVIC EXAMINATION: CPT

## 2024-08-21 PROCEDURE — 99214 OFFICE O/P EST MOD 30 MIN: CPT

## 2024-08-21 PROCEDURE — G2211 COMPLEX E/M VISIT ADD ON: CPT

## 2024-08-21 PROCEDURE — 99215 OFFICE O/P EST HI 40 MIN: CPT

## 2024-08-21 RX ORDER — LOSARTAN POTASSIUM 100 MG/1
100 TABLET, FILM COATED ORAL
Refills: 0 | Status: ACTIVE | COMMUNITY

## 2024-08-21 NOTE — REVIEW OF SYSTEMS
[Diarrhea: Grade 0] : Diarrhea: Grade 0 [Negative] : Allergic/Immunologic [Abdominal Pain] : no abdominal pain [Vomiting] : no vomiting [de-identified] : neuropathy intoes improving. Fingers almost resolved

## 2024-08-21 NOTE — REVIEW OF SYSTEMS
[Diarrhea: Grade 0] : Diarrhea: Grade 0 [Negative] : Allergic/Immunologic [Abdominal Pain] : no abdominal pain [Vomiting] : no vomiting [de-identified] : neuropathy intoes improving. Fingers almost resolved

## 2024-08-21 NOTE — HISTORY OF PRESENT ILLNESS
[Disease: _____________________] : Disease: [unfilled] [de-identified] : 63 year old female presents today for initial consultation of high grade serous carcinoma, FIGO stage IIIB, referred by Dr. Harish Duenas.  Patient originally presented to ED with abdominal pain and was found to have a lobulated right uterine mass along with diverticulitis.   CT 10/17/23- Findings compatible with acute diverticulitis of the upper sigmoid colon. Cholelithiasis and slight gallbladder wall thickening. No pericholecystic fluid or biliary duct dilation. A lobulated right superior periuterine mass measuring 7 x 5.5 x 7 cm may be a pedunculated myoma, with masses of other etiology not excluded. Follow-up pelvic ultrasound can be performed for further evaluation in this regard.  F/U TC US dated 10/30/23: Enlarged multifibroid uterus including a 7.2 cm dominant right fundal pedunculated fibroid on a stalk. 8.9 cm heterogeneous left adnexal mass of uncertain etiology. Left ovary was not visualized. Pedunculated fibroid versus left adnexal mass is not excluded. Recommend MRI pelvis with and without contrast for further clarification.  MRI 23: Bilateral ovarian masses and findings suspicious for peritoneal carcinomatosis with small volume ascites. In light of the elevated CA-125 levels, primary ovarian malignancy is most likely although differential would include ovarian metastases.  Patient underwent TAHBSO with OMx and resection of diaphragmatic lesions wIth Dr. Moser on 23:  Pathology: Final Diagnosis 1.   Right fallopian tube and ovary - High grade serous carcinoma involving ovary - Benign fallopian tube  2.    Left fallopian tube and ovary - High grade serous carcinoma involving ovary and fallopian tube  3.   Uterus and cervix - Endometrium with small focus of atypical hyperplasia, see note - Myometrium with leiomyoma - Benign cervix  Note:  The entire endometrium has been submitted for microscopic examination.  4.   Omentum - Benign fibroadipose tissue  5.   Right diaphragmatic nodule - High grade serous carcinoma involving fibrous tissue, see note  Note:  The tumor is best seen on the permanent stained slide.  6  Right diaphragmatic nodule #2 - High grade serous carcinoma involving fibrous tissue, see note  Note:  The tumor is best seen on the permanent stained slide.  7.   Nodule over inferior vena cava - High grade serous carcinoma  involving fibrous tissue  Synoptic Summary 2: Ovary, Fallopian Tube, or Primary Perito Specimen Procedure:  Total hysterectomy and bilateral salpingo-oophorectomy;  Omentectomy Right Ovary Integrity:   Capsule intact Left Ovary Integrity:   Capsule ruptured Right Fallopian Tube Integrity:   Serosa intact Left Fallopian Tube Integrity:   Serosa intact Tumor Tumor Site:  Bilateral ovaries Tumor Size:  9 Centimeters (cm) Histologic Type:   High grade serous carcinoma Histologic Grade:   High grade Ovarian Surface Involvement:   Not identified Fallopian Tube Surface Involvement:    Not identified Other Tissue / Organ Involvement:    Pelvic peritoneum;  Abdominal peritoneum Largest Extrapelvic Peritoneal Focus:    Macroscopic (2 cm or less) Peritoneal / Ascitic Fluid Involvement:    Not submitted / unknown Chemotherapy Response Score (CRS):    No known presurgical therapy Regional Lymph Nodes Regional Lymph Node Status:   Not applicable (no regional lymph nodes submitted or found)  pTNM Classification (AJCC 8th Edition) pT Category:   pT3b pN Category:   pN not assigned (no nodes submitted or found) FIGO Stage:  IIIB  Screenings: Colonoscopy: - normal repeat 10 years PAP: 2023- normal Mammo: 2023- abnormal- biopsy negative per pt   GYNHx; h/o 1 abnormal pap in 2019- HPV +, colpo and f/b cryo. h/o fibroid 11 years ago. denies ovarian cyst. 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 Started Zejula 5/28. Tolerating well. Metal taste has resolved Reports feeling well  PET 5/16/2024

## 2024-08-21 NOTE — CONSULT LETTER
[Dear  ___] : Dear ~SHAILESH, [Consult Letter:] : I had the pleasure of evaluating your patient, [unfilled]. [Please see my note below.] : Please see my note below. [Consult Closing:] : Thank you very much for allowing me to participate in the care of this patient.  If you have any questions, please do not hesitate to contact me. [Sincerely,] : Sincerely, [FreeTextEntry3] : Chelsey Lyn MD  of Medicine [facility] of Medicine [facility]

## 2024-08-21 NOTE — ASSESSMENT
[FreeTextEntry1] : 64-year-old female presents today for initial consultation of high grade serous ovarian carcinoma, FIGO stage IIIB, referred by Dr. Harish Duenas. S/p sx 12/14/2023. HRD negative. Carbo/paclitaxel cycle 1-6 1/2024- 5/1/24 increased fatigue, nausea. PETCT ordered for restaging   Carbo/paclitaxel cycle 1-6 1/2024- 5/1/24 PETCT - 5/24- RAJNI- plan for re imaging end of Aug 2024 Reviewed pathology - HRD negative, PDL-1 expression 6. 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

## 2024-08-21 NOTE — HISTORY OF PRESENT ILLNESS
[Disease: _____________________] : Disease: [unfilled] [de-identified] : 63 year old female presents today for initial consultation of high grade serous carcinoma, FIGO stage IIIB, referred by Dr. Harish Duenas.  Patient originally presented to ED with abdominal pain and was found to have a lobulated right uterine mass along with diverticulitis.   CT 10/17/23- Findings compatible with acute diverticulitis of the upper sigmoid colon. Cholelithiasis and slight gallbladder wall thickening. No pericholecystic fluid or biliary duct dilation. A lobulated right superior periuterine mass measuring 7 x 5.5 x 7 cm may be a pedunculated myoma, with masses of other etiology not excluded. Follow-up pelvic ultrasound can be performed for further evaluation in this regard.  F/U TC US dated 10/30/23: Enlarged multifibroid uterus including a 7.2 cm dominant right fundal pedunculated fibroid on a stalk. 8.9 cm heterogeneous left adnexal mass of uncertain etiology. Left ovary was not visualized. Pedunculated fibroid versus left adnexal mass is not excluded. Recommend MRI pelvis with and without contrast for further clarification.  MRI 23: Bilateral ovarian masses and findings suspicious for peritoneal carcinomatosis with small volume ascites. In light of the elevated CA-125 levels, primary ovarian malignancy is most likely although differential would include ovarian metastases.  Patient underwent TAHBSO with OMx and resection of diaphragmatic lesions wIth Dr. Moser on 23:  Pathology: Final Diagnosis 1.   Right fallopian tube and ovary - High grade serous carcinoma involving ovary - Benign fallopian tube  2.    Left fallopian tube and ovary - High grade serous carcinoma involving ovary and fallopian tube  3.   Uterus and cervix - Endometrium with small focus of atypical hyperplasia, see note - Myometrium with leiomyoma - Benign cervix  Note:  The entire endometrium has been submitted for microscopic examination.  4.   Omentum - Benign fibroadipose tissue  5.   Right diaphragmatic nodule - High grade serous carcinoma involving fibrous tissue, see note  Note:  The tumor is best seen on the permanent stained slide.  6  Right diaphragmatic nodule #2 - High grade serous carcinoma involving fibrous tissue, see note  Note:  The tumor is best seen on the permanent stained slide.  7.   Nodule over inferior vena cava - High grade serous carcinoma  involving fibrous tissue  Synoptic Summary 2: Ovary, Fallopian Tube, or Primary Perito Specimen Procedure:  Total hysterectomy and bilateral salpingo-oophorectomy;  Omentectomy Right Ovary Integrity:   Capsule intact Left Ovary Integrity:   Capsule ruptured Right Fallopian Tube Integrity:   Serosa intact Left Fallopian Tube Integrity:   Serosa intact Tumor Tumor Site:  Bilateral ovaries Tumor Size:  9 Centimeters (cm) Histologic Type:   High grade serous carcinoma Histologic Grade:   High grade Ovarian Surface Involvement:   Not identified Fallopian Tube Surface Involvement:    Not identified Other Tissue / Organ Involvement:    Pelvic peritoneum;  Abdominal peritoneum Largest Extrapelvic Peritoneal Focus:    Macroscopic (2 cm or less) Peritoneal / Ascitic Fluid Involvement:    Not submitted / unknown Chemotherapy Response Score (CRS):    No known presurgical therapy Regional Lymph Nodes Regional Lymph Node Status:   Not applicable (no regional lymph nodes submitted or found)  pTNM Classification (AJCC 8th Edition) pT Category:   pT3b pN Category:   pN not assigned (no nodes submitted or found) FIGO Stage:  IIIB  Screenings: Colonoscopy: - normal repeat 10 years PAP: 2023- normal Mammo: 2023- abnormal- biopsy negative per pt   GYNHx; h/o 1 abnormal pap in 2019- HPV +, colpo and f/b cryo. h/o fibroid 11 years ago. denies ovarian cyst. 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 Started Zejula 5/28. Tolerating well. Metal taste has resolved Reports feeling well  PET 5/16/2024

## 2024-08-21 NOTE — CONSULT LETTER
[Dear  ___] : Dear ~SHAILESH, [Consult Letter:] : I had the pleasure of evaluating your patient, [unfilled]. [Please see my note below.] : Please see my note below. [Consult Closing:] : Thank you very much for allowing me to participate in the care of this patient.  If you have any questions, please do not hesitate to contact me. [Sincerely,] : Sincerely, [FreeTextEntry3] : Chelsey Lyn MD  of Medicine [facility] of Medicine [facility]

## 2024-08-21 NOTE — CONSULT LETTER
[Dear  ___] : Dear ~SHAILESH, [Consult Letter:] : I had the pleasure of evaluating your patient, [unfilled]. [Please see my note below.] : Please see my note below. [Consult Closing:] : Thank you very much for allowing me to participate in the care of this patient.  If you have any questions, please do not hesitate to contact me. [Sincerely,] : Sincerely, [FreeTextEntry3] : Chelsey Lyn MD  of Medicine [facility] of Medicine [facility]

## 2024-08-21 NOTE — HISTORY OF PRESENT ILLNESS
[Disease: _____________________] : Disease: [unfilled] [de-identified] : 63 year old female presents today for initial consultation of high grade serous carcinoma, FIGO stage IIIB, referred by Dr. Harish Duenas.  Patient originally presented to ED with abdominal pain and was found to have a lobulated right uterine mass along with diverticulitis.   CT 10/17/23- Findings compatible with acute diverticulitis of the upper sigmoid colon. Cholelithiasis and slight gallbladder wall thickening. No pericholecystic fluid or biliary duct dilation. A lobulated right superior periuterine mass measuring 7 x 5.5 x 7 cm may be a pedunculated myoma, with masses of other etiology not excluded. Follow-up pelvic ultrasound can be performed for further evaluation in this regard.  F/U TC US dated 10/30/23: Enlarged multifibroid uterus including a 7.2 cm dominant right fundal pedunculated fibroid on a stalk. 8.9 cm heterogeneous left adnexal mass of uncertain etiology. Left ovary was not visualized. Pedunculated fibroid versus left adnexal mass is not excluded. Recommend MRI pelvis with and without contrast for further clarification.  MRI 23: Bilateral ovarian masses and findings suspicious for peritoneal carcinomatosis with small volume ascites. In light of the elevated CA-125 levels, primary ovarian malignancy is most likely although differential would include ovarian metastases.  Patient underwent TAHBSO with OMx and resection of diaphragmatic lesions wIth Dr. Moser on 23:  Pathology: Final Diagnosis 1.   Right fallopian tube and ovary - High grade serous carcinoma involving ovary - Benign fallopian tube  2.    Left fallopian tube and ovary - High grade serous carcinoma involving ovary and fallopian tube  3.   Uterus and cervix - Endometrium with small focus of atypical hyperplasia, see note - Myometrium with leiomyoma - Benign cervix  Note:  The entire endometrium has been submitted for microscopic examination.  4.   Omentum - Benign fibroadipose tissue  5.   Right diaphragmatic nodule - High grade serous carcinoma involving fibrous tissue, see note  Note:  The tumor is best seen on the permanent stained slide.  6  Right diaphragmatic nodule #2 - High grade serous carcinoma involving fibrous tissue, see note  Note:  The tumor is best seen on the permanent stained slide.  7.   Nodule over inferior vena cava - High grade serous carcinoma  involving fibrous tissue  Synoptic Summary 2: Ovary, Fallopian Tube, or Primary Perito Specimen Procedure:  Total hysterectomy and bilateral salpingo-oophorectomy;  Omentectomy Right Ovary Integrity:   Capsule intact Left Ovary Integrity:   Capsule ruptured Right Fallopian Tube Integrity:   Serosa intact Left Fallopian Tube Integrity:   Serosa intact Tumor Tumor Site:  Bilateral ovaries Tumor Size:  9 Centimeters (cm) Histologic Type:   High grade serous carcinoma Histologic Grade:   High grade Ovarian Surface Involvement:   Not identified Fallopian Tube Surface Involvement:    Not identified Other Tissue / Organ Involvement:    Pelvic peritoneum;  Abdominal peritoneum Largest Extrapelvic Peritoneal Focus:    Macroscopic (2 cm or less) Peritoneal / Ascitic Fluid Involvement:    Not submitted / unknown Chemotherapy Response Score (CRS):    No known presurgical therapy Regional Lymph Nodes Regional Lymph Node Status:   Not applicable (no regional lymph nodes submitted or found)  pTNM Classification (AJCC 8th Edition) pT Category:   pT3b pN Category:   pN not assigned (no nodes submitted or found) FIGO Stage:  IIIB  Screenings: Colonoscopy: - normal repeat 10 years PAP: 2023- normal Mammo: 2023- abnormal- biopsy negative per pt   GYNHx; h/o 1 abnormal pap in 2019- HPV +, colpo and f/b cryo. h/o fibroid 11 years ago. denies ovarian cyst. 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 Started Zejula 5/28. Tolerating well. Metal taste has resolved Reports feeling well  PET 5/16/2024

## 2024-08-21 NOTE — REVIEW OF SYSTEMS
[Diarrhea: Grade 0] : Diarrhea: Grade 0 [Negative] : Allergic/Immunologic [Abdominal Pain] : no abdominal pain [Vomiting] : no vomiting [de-identified] : neuropathy intoes improving. Fingers almost resolved

## 2024-08-22 NOTE — HISTORY OF PRESENT ILLNESS
[FreeTextEntry1] : 63yo here for follow up  Dx: Stage 3B HGS ovarian cancer, Invitae negative. NGS = PDL1+, +p53, negative HRD Tx: Exlap JASMYN, BSO, OMx and resection of diaphragmatic lesions, optimal R0 resection on 12/13/23 @ [facility] Adjtx: carbo/taxol q3wks (Dr. Lyn) started 1/18/24 to 5/1/24, total 6 cycles Maintenance therapy: Niraparib 5/28/24 to present  Since last visit patient reports doing well. she started niraparib PARPi maintenance and is tolerating well so far. she denies pain/fever/bleeding/bloating. She has normal activity tolerance and normal appetite. Reports normal urination and BMs. post-tx scan RAJNI. ca125 wnl. pt due for f/u surveillance scan end of this month.  Ca125  = 11 (4/2024), 17 (2/28/24), 92 (1/2024), 608 (11/2023)  Pathology Specimen(s) Submitted 1 Right fallopian tube and ovary 2 Left fallopian tube and ovary 3 Uterus and cervix 4 Omentum 5 Right diaphragmatic nodule 6 Right diaphragmatic nodule #2 7 Nodule over inferior vena cava Final Diagnosis 1. Right fallopian tube and ovary - High grade serous carcinoma involving ovary - Benign fallopian tube 2. Left fallopian tube and ovary - High grade serous carcinoma involving ovary and fallopian tube 3. Uterus and cervix - Endometrium with small focus of atypical hyperplasia, see note - Myometrium with leiomyoma - Benign cervix Note: The entire endometrium has been submitted for microscopic examination. 4. Omentum - Benign fibroadipose tissue 5. Right diaphragmatic nodule - High grade serous carcinoma involving fibrous tissue, see note Note: The tumor is best seen on the permanent stained slide. 6 Right diaphragmatic nodule #2 - High grade serous carcinoma involving fibrous tissue, see note Note: The tumor is best seen on the permanent stained slide. 7. Nodule over inferior vena cava - High grade serous carcinoma involving fibrous tissue Verified by: Gracy Melendez MD (Electronic Signature) Reported on: 12/28/23 09:48 EST, [id number], [place] [address] Phone: [phone] Fax: [phone] _________________________________________________________________  Synoptic Summary 2: Ovary, Fallopian Tube, or Primary Perito Specimen Procedure: Total hysterectomy and bilateral salpingo-oophorectomy; Omentectomy Right Ovary Integrity: Capsule intact Left Ovary Integrity: Capsule ruptured Right Fallopian Tube Integrity: Serosa intact Left Fallopian Tube Integrity: Serosa intact Tumor Tumor Site: Bilateral ovaries Tumor Size: 9 Centimeters (cm) Histologic Type: High grade serous carcinoma Histologic Grade: High grade Ovarian Surface Involvement: Not identified Fallopian Tube Surface Involvement: Not identified Other Tissue / Organ Involvement: Pelvic peritoneum; Abdominal peritoneum Largest Extrapelvic Peritoneal Focus: Macroscopic (2 cm or less) Peritoneal / Ascitic Fluid Involvement: Not submitted / unknown Chemotherapy Response Score (CRS): No known presurgical therapy Regional Lymph Nodes Regional Lymph Node Status: Not applicable (no regional lymph nodes submitted or found) pTNM Classification (AJCC 8th Edition) pT Category: pT3b pN Category: pN not assigned (no nodes submitted or found) FIGO Stage FIGO Stage: IIIB  PETCT 5/16/24: FINDINGS:  HEAD/NECK: Physiologic FDG activity in visualized brain, head, and neck.  THORAX: No abnormal FDG activity. No suspicious hypermetabolic lymphadenopathy.  LUNGS: No abnormal FDG activity. No concerning nodule.  PLEURA/PERICARDIUM: No abnormal FDG activity. No effusion.  HEPATOBILIARY/PANCREAS: Physiologic FDG activity.  For reference, normal liver demonstrates SUV mean 2.2.  Cholelithiasis. No evidence of  SPLEEN: Physiologic FDG activity. Normal in size.  ADRENAL GLANDS: No abnormal FDG activity. No nodule.  KIDNEYS/URINARY BLADDER: Physiologic excreted FDG activity.  REPRODUCTIVE ORGANS: Hysterectomy; symmetric tissues at the vaginal cuff.  No suspicious FDG avid tissue in the pelvis.  ABDOMINOPELVIC LYMPH NODES/RETROPERITONEUM: No enlarged or FDG-avid lymph node.  ESOPHAGUS/STOMACH/BOWEL/PERITONEUM/MESENTERY: No abnormal FDG activity. Small hiatal hernia. Scattered diverticular sacs.  VESSELS: Few scattered atherosclerotic changes in the aorta. No aneurysm.  BONES/SOFT TISSUES: Mildly increased homogeneous activity throughout the osseous structures compatible with reactive marrow. IMPRESSION:  1. S/P hysterectomy. No suspicious FDG avid malignant tissue in the pelvis or elsewhere in the imaging field of view.  2. Cholelithiasis.

## 2024-08-22 NOTE — PHYSICAL EXAM
[Fully active, able to carry on all pre-disease performance without restriction] : Status 0 - Fully active, able to carry on all pre-disease performance without restriction [Chaperone Present] : A chaperone was present in the examining room during all aspects of the physical examination [[id number]] : A chaperone was present during the pelvic exam.

## 2024-08-22 NOTE — HISTORY OF PRESENT ILLNESS
[FreeTextEntry1] : 65yo here for follow up  Dx: Stage 3B HGS ovarian cancer, Invitae negative. NGS = PDL1+, +p53, negative HRD Tx: Exlap JASMYN, BSO, OMx and resection of diaphragmatic lesions, optimal R0 resection on 12/13/23 @ [facility] Adjtx: carbo/taxol q3wks (Dr. Lyn) started 1/18/24 to 5/1/24, total 6 cycles Maintenance therapy: Niraparib 5/28/24 to present  Since last visit patient reports doing well. she started niraparib PARPi maintenance and is tolerating well so far. she denies pain/fever/bleeding/bloating. She has normal activity tolerance and normal appetite. Reports normal urination and BMs. post-tx scan RAJNI. ca125 wnl. pt due for f/u surveillance scan end of this month.  Ca125  = 11 (4/2024), 17 (2/28/24), 92 (1/2024), 608 (11/2023)  Pathology Specimen(s) Submitted 1 Right fallopian tube and ovary 2 Left fallopian tube and ovary 3 Uterus and cervix 4 Omentum 5 Right diaphragmatic nodule 6 Right diaphragmatic nodule #2 7 Nodule over inferior vena cava Final Diagnosis 1. Right fallopian tube and ovary - High grade serous carcinoma involving ovary - Benign fallopian tube 2. Left fallopian tube and ovary - High grade serous carcinoma involving ovary and fallopian tube 3. Uterus and cervix - Endometrium with small focus of atypical hyperplasia, see note - Myometrium with leiomyoma - Benign cervix Note: The entire endometrium has been submitted for microscopic examination. 4. Omentum - Benign fibroadipose tissue 5. Right diaphragmatic nodule - High grade serous carcinoma involving fibrous tissue, see note Note: The tumor is best seen on the permanent stained slide. 6 Right diaphragmatic nodule #2 - High grade serous carcinoma involving fibrous tissue, see note Note: The tumor is best seen on the permanent stained slide. 7. Nodule over inferior vena cava - High grade serous carcinoma involving fibrous tissue Verified by: Gracy Melendez MD (Electronic Signature) Reported on: 12/28/23 09:48 EST, [id number] [facility], [place] [address] Phone: [phone] Fax: [phone] _________________________________________________________________  Synoptic Summary 2: Ovary, Fallopian Tube, or Primary Perito Specimen Procedure: Total hysterectomy and bilateral salpingo-oophorectomy; Omentectomy Right Ovary Integrity: Capsule intact Left Ovary Integrity: Capsule ruptured Right Fallopian Tube Integrity: Serosa intact Left Fallopian Tube Integrity: Serosa intact Tumor Tumor Site: Bilateral ovaries Tumor Size: 9 Centimeters (cm) Histologic Type: High grade serous carcinoma Histologic Grade: High grade Ovarian Surface Involvement: Not identified Fallopian Tube Surface Involvement: Not identified Other Tissue / Organ Involvement: Pelvic peritoneum; Abdominal peritoneum Largest Extrapelvic Peritoneal Focus: Macroscopic (2 cm or less) Peritoneal / Ascitic Fluid Involvement: Not submitted / unknown Chemotherapy Response Score (CRS): No known presurgical therapy Regional Lymph Nodes Regional Lymph Node Status: Not applicable (no regional lymph nodes submitted or found) pTNM Classification (AJCC 8th Edition) pT Category: pT3b pN Category: pN not assigned (no nodes submitted or found) FIGO Stage FIGO Stage: IIIB  PETCT 5/16/24: FINDINGS:  HEAD/NECK: Physiologic FDG activity in visualized brain, head, and neck.  THORAX: No abnormal FDG activity. No suspicious hypermetabolic lymphadenopathy.  LUNGS: No abnormal FDG activity. No concerning nodule.  PLEURA/PERICARDIUM: No abnormal FDG activity. No effusion.  HEPATOBILIARY/PANCREAS: Physiologic FDG activity.  For reference, normal liver demonstrates SUV mean 2.2.  Cholelithiasis. No evidence of  SPLEEN: Physiologic FDG activity. Normal in size.  ADRENAL GLANDS: No abnormal FDG activity. No nodule.  KIDNEYS/URINARY BLADDER: Physiologic excreted FDG activity.  REPRODUCTIVE ORGANS: Hysterectomy; symmetric tissues at the vaginal cuff.  No suspicious FDG avid tissue in the pelvis.  ABDOMINOPELVIC LYMPH NODES/RETROPERITONEUM: No enlarged or FDG-avid lymph node.  ESOPHAGUS/STOMACH/BOWEL/PERITONEUM/MESENTERY: No abnormal FDG activity. Small hiatal hernia. Scattered diverticular sacs.  VESSELS: Few scattered atherosclerotic changes in the aorta. No aneurysm.  BONES/SOFT TISSUES: Mildly increased homogeneous activity throughout the osseous structures compatible with reactive marrow. IMPRESSION:  1. S/P hysterectomy. No suspicious FDG avid malignant tissue in the pelvis or elsewhere in the imaging field of view.  2. Cholelithiasis.

## 2024-08-22 NOTE — DISCUSSION/SUMMARY
[Reviewed Clinical Lab Test(s)] : Results of clinical tests were reviewed. [Reviewed Radiology Report(s)] : Radiology reports were reviewed. [Reviewed Radiology Film/Image(s)] : Images from radiology studies were reviewed and examined. [Discuss Alternatives/Risks/Benefits w/Patient] : All alternatives, risks, and benefits were discussed with the patient/family and all questions were answered.  Patient expressed good understanding and appreciates the importance of follow up as recommended. [Visit Time ___ Minutes] : [unfilled] minutes [Face to Face Time___ Minutes] : with [unfilled] minutes in face to face consultation. 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 s/p 6 cycles carbo/taxol , post-tx scan RAJNI and ca125 WNL. Patient started parpi maintenance with niraparib 5/28/24, tolerating well. RAJNI on exam today. DFI 3 months. -more than 50% of visit spent face to face with patient counseling and coordinating care with high level complexity -I reviewed diagnosis, stage of disease, treatment course and excellent response. reviewed natural hx of this disease and high risk for recurrence. we reviewed data on PARPi maintenance therapy and current indication that approves Niraparib for all comers as maintenance therapy after front-line tx regardless of germline/somatic testing status. SEs and toxicities reviewed by me and with medonc. Patient to continue PARPi therapy -will continue surveillance ca125 and q3 months scans as per medonc -pt to continue f/u with medonc and PCP -rtc 3months -pain/fever/bleeding precautions given.

## 2024-08-22 NOTE — PHYSICAL EXAM
[Fully active, able to carry on all pre-disease performance without restriction] : Status 0 - Fully active, able to carry on all pre-disease performance without restriction [Chaperone Present] : A chaperone was present in the examining room during all aspects of the physical examination [[id number]] : A chaperone was present during the pelvic exam.

## 2024-08-22 NOTE — DISCUSSION/SUMMARY
[Reviewed Clinical Lab Test(s)] : Results of clinical tests were reviewed. [Reviewed Radiology Report(s)] : Radiology reports were reviewed. [Reviewed Radiology Film/Image(s)] : Images from radiology studies were reviewed and examined. [Discuss Alternatives/Risks/Benefits w/Patient] : All alternatives, risks, and benefits were discussed with the patient/family and all questions were answered.  Patient expressed good understanding and appreciates the importance of follow up as recommended. [Visit Time ___ Minutes] : [unfilled] minutes [Face to Face Time___ Minutes] : with [unfilled] minutes in face to face consultation. 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 s/p 6 cycles carbo/taxol , post-tx scan RAJNI and ca125 WNL. Patient started parpi maintenance with niraparib 5/28/24, tolerating well. RAJNI on exam today. DFI 3 months. -more than 50% of visit spent face to face with patient counseling and coordinating care with high level complexity -I reviewed diagnosis, stage of disease, treatment course and excellent response. reviewed natural hx of this disease and high risk for recurrence. we reviewed data on PARPi maintenance therapy and current indication that approves Niraparib for all comers as maintenance therapy after front-line tx regardless of germline/somatic testing status. SEs and toxicities reviewed by me and with medonc. Patient to continue PARPi therapy -will continue surveillance ca125 and q3 months scans as per medonc -pt to continue f/u with medonc and PCP -rtc 3months -pain/fever/bleeding precautions given.

## 2024-08-23 ENCOUNTER — APPOINTMENT (OUTPATIENT)
Dept: CARDIOLOGY | Facility: CLINIC | Age: 64
End: 2024-08-23

## 2024-08-23 PROCEDURE — 93306 TTE W/DOPPLER COMPLETE: CPT

## 2024-08-28 ENCOUNTER — RESULT REVIEW (OUTPATIENT)
Age: 64
End: 2024-08-28

## 2024-08-28 ENCOUNTER — APPOINTMENT (OUTPATIENT)
Dept: CARDIOLOGY | Facility: CLINIC | Age: 64
End: 2024-08-28

## 2024-08-28 ENCOUNTER — APPOINTMENT (OUTPATIENT)
Dept: HEMATOLOGY ONCOLOGY | Facility: CLINIC | Age: 64
End: 2024-08-28

## 2024-08-30 ENCOUNTER — TRANSCRIPTION ENCOUNTER (OUTPATIENT)
Age: 64
End: 2024-08-30

## 2024-09-04 ENCOUNTER — RESULT REVIEW (OUTPATIENT)
Age: 64
End: 2024-09-04

## 2024-09-04 ENCOUNTER — APPOINTMENT (OUTPATIENT)
Dept: HEMATOLOGY ONCOLOGY | Facility: CLINIC | Age: 64
End: 2024-09-04

## 2024-09-04 VITALS
SYSTOLIC BLOOD PRESSURE: 127 MMHG | HEART RATE: 88 BPM | RESPIRATION RATE: 18 BRPM | BODY MASS INDEX: 27.77 KG/M2 | HEIGHT: 62 IN | OXYGEN SATURATION: 100 % | TEMPERATURE: 97.9 F | DIASTOLIC BLOOD PRESSURE: 66 MMHG | WEIGHT: 150.9 LBS

## 2024-09-05 ENCOUNTER — NON-APPOINTMENT (OUTPATIENT)
Age: 64
End: 2024-09-05

## 2024-09-06 ENCOUNTER — TRANSCRIPTION ENCOUNTER (OUTPATIENT)
Age: 64
End: 2024-09-06

## 2024-09-11 ENCOUNTER — APPOINTMENT (OUTPATIENT)
Dept: HEMATOLOGY ONCOLOGY | Facility: CLINIC | Age: 64
End: 2024-09-11

## 2024-09-11 ENCOUNTER — RESULT REVIEW (OUTPATIENT)
Age: 64
End: 2024-09-11

## 2024-09-11 VITALS
TEMPERATURE: 98.1 F | HEIGHT: 62 IN | OXYGEN SATURATION: 96 % | SYSTOLIC BLOOD PRESSURE: 124 MMHG | RESPIRATION RATE: 18 BRPM | HEART RATE: 86 BPM | BODY MASS INDEX: 27.95 KG/M2 | WEIGHT: 151.9 LBS | DIASTOLIC BLOOD PRESSURE: 74 MMHG

## 2024-09-12 LAB — FERRITIN SERPL-MCNC: 429 NG/ML

## 2024-09-15 LAB — EPO SERPL-MCNC: 163.9 MIU/ML

## 2024-09-16 ENCOUNTER — RESULT REVIEW (OUTPATIENT)
Age: 64
End: 2024-09-16

## 2024-09-16 ENCOUNTER — LABORATORY RESULT (OUTPATIENT)
Age: 64
End: 2024-09-16

## 2024-09-16 ENCOUNTER — APPOINTMENT (OUTPATIENT)
Dept: HEMATOLOGY ONCOLOGY | Facility: CLINIC | Age: 64
End: 2024-09-16

## 2024-09-16 VITALS
WEIGHT: 152.9 LBS | RESPIRATION RATE: 18 BRPM | BODY MASS INDEX: 28.14 KG/M2 | HEART RATE: 84 BPM | DIASTOLIC BLOOD PRESSURE: 72 MMHG | HEIGHT: 62 IN | TEMPERATURE: 98.6 F | OXYGEN SATURATION: 96 % | SYSTOLIC BLOOD PRESSURE: 123 MMHG

## 2024-09-25 ENCOUNTER — RESULT REVIEW (OUTPATIENT)
Age: 64
End: 2024-09-25

## 2024-09-25 ENCOUNTER — APPOINTMENT (OUTPATIENT)
Dept: HEMATOLOGY ONCOLOGY | Facility: CLINIC | Age: 64
End: 2024-09-25
Payer: COMMERCIAL

## 2024-09-25 ENCOUNTER — LABORATORY RESULT (OUTPATIENT)
Age: 64
End: 2024-09-25

## 2024-09-25 VITALS
HEART RATE: 91 BPM | RESPIRATION RATE: 18 BRPM | HEIGHT: 62 IN | OXYGEN SATURATION: 98 % | DIASTOLIC BLOOD PRESSURE: 85 MMHG | BODY MASS INDEX: 27.71 KG/M2 | WEIGHT: 150.6 LBS | TEMPERATURE: 98.3 F | SYSTOLIC BLOOD PRESSURE: 141 MMHG

## 2024-09-25 DIAGNOSIS — C78.6 SECONDARY MALIGNANT NEOPLASM OF RETROPERITONEUM AND PERITONEUM: ICD-10-CM

## 2024-09-25 PROCEDURE — 99213 OFFICE O/P EST LOW 20 MIN: CPT

## 2024-09-25 PROCEDURE — 36415 COLL VENOUS BLD VENIPUNCTURE: CPT

## 2024-10-02 NOTE — HISTORY OF PRESENT ILLNESS
[Disease: _____________________] : Disease: [unfilled] [de-identified] : 63 year old female presents today for initial consultation of high grade serous carcinoma, FIGO stage IIIB, referred by Dr. Harish Duenas.  Patient originally presented to ED with abdominal pain and was found to have a lobulated right uterine mass along with diverticulitis.   CT 10/17/23- Findings compatible with acute diverticulitis of the upper sigmoid colon. Cholelithiasis and slight gallbladder wall thickening. No pericholecystic fluid or biliary duct dilation. A lobulated right superior periuterine mass measuring 7 x 5.5 x 7 cm may be a pedunculated myoma, with masses of other etiology not excluded. Follow-up pelvic ultrasound can be performed for further evaluation in this regard.  F/U TC US dated 10/30/23: Enlarged multifibroid uterus including a 7.2 cm dominant right fundal pedunculated fibroid on a stalk. 8.9 cm heterogeneous left adnexal mass of uncertain etiology. Left ovary was not visualized. Pedunculated fibroid versus left adnexal mass is not excluded. Recommend MRI pelvis with and without contrast for further clarification.  MRI 23: Bilateral ovarian masses and findings suspicious for peritoneal carcinomatosis with small volume ascites. In light of the elevated CA-125 levels, primary ovarian malignancy is most likely although differential would include ovarian metastases.  Patient underwent TAHBSO with OMx and resection of diaphragmatic lesions wIth Dr. Moser on 23:  Pathology: Final Diagnosis 1.   Right fallopian tube and ovary - High grade serous carcinoma involving ovary - Benign fallopian tube  2.    Left fallopian tube and ovary - High grade serous carcinoma involving ovary and fallopian tube  3.   Uterus and cervix - Endometrium with small focus of atypical hyperplasia, see note - Myometrium with leiomyoma - Benign cervix  Note:  The entire endometrium has been submitted for microscopic examination.  4.   Omentum - Benign fibroadipose tissue  5.   Right diaphragmatic nodule - High grade serous carcinoma involving fibrous tissue, see note  Note:  The tumor is best seen on the permanent stained slide.  6  Right diaphragmatic nodule #2 - High grade serous carcinoma involving fibrous tissue, see note  Note:  The tumor is best seen on the permanent stained slide.  7.   Nodule over inferior vena cava - High grade serous carcinoma  involving fibrous tissue  Synoptic Summary 2: Ovary, Fallopian Tube, or Primary Perito Specimen Procedure:  Total hysterectomy and bilateral salpingo-oophorectomy;  Omentectomy Right Ovary Integrity:   Capsule intact Left Ovary Integrity:   Capsule ruptured Right Fallopian Tube Integrity:   Serosa intact Left Fallopian Tube Integrity:   Serosa intact Tumor Tumor Site:  Bilateral ovaries Tumor Size:  9 Centimeters (cm) Histologic Type:   High grade serous carcinoma Histologic Grade:   High grade Ovarian Surface Involvement:   Not identified Fallopian Tube Surface Involvement:    Not identified Other Tissue / Organ Involvement:    Pelvic peritoneum;  Abdominal peritoneum Largest Extrapelvic Peritoneal Focus:    Macroscopic (2 cm or less) Peritoneal / Ascitic Fluid Involvement:    Not submitted / unknown Chemotherapy Response Score (CRS):    No known presurgical therapy Regional Lymph Nodes Regional Lymph Node Status:   Not applicable (no regional lymph nodes submitted or found)  pTNM Classification (AJCC 8th Edition) pT Category:   pT3b pN Category:   pN not assigned (no nodes submitted or found) FIGO Stage:  IIIB  Screenings: Colonoscopy: - normal repeat 10 years PAP: 2023- normal Mammo: 2023- abnormal- biopsy negative per pt   GYNHx; h/o 1 abnormal pap in 2019- HPV +, colpo and f/b cryo. h/o fibroid 11 years ago. denies ovarian cyst. 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 Started Zejula 5/28. Tolerating well. Metal taste has resolved Reports feeling well  PET 5/16/2024

## 2024-10-02 NOTE — REVIEW OF SYSTEMS
[Diarrhea: Grade 0] : Diarrhea: Grade 0 [Negative] : Allergic/Immunologic [Abdominal Pain] : no abdominal pain [Vomiting] : no vomiting [de-identified] : neuropathy intoes improving. Fingers almost resolved

## 2024-10-02 NOTE — ASSESSMENT
[FreeTextEntry1] : 64-year-old female presents today for initial consultation of high grade serous ovarian carcinoma, FIGO stage IIIB, referred by Dr. Harish Duenas. S/p sx 12/14/2023. HRD negative. Carbo/paclitaxel cycle 1-6 1/2024- 5/1/24 increased fatigue, nausea. PETCT ordered for restaging   Carbo/paclitaxel cycle 1-6 1/2024- 5/1/24 PETCT - 5/24- RAJNI- plan for re imaging end of Aug 2024 Reviewed pathology - HRD negative, PDL-1 expression 6. 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

## 2024-10-02 NOTE — HISTORY OF PRESENT ILLNESS
[Disease: _____________________] : Disease: [unfilled] [de-identified] : 63 year old female presents today for initial consultation of high grade serous carcinoma, FIGO stage IIIB, referred by Dr. Harish Duenas.  Patient originally presented to ED with abdominal pain and was found to have a lobulated right uterine mass along with diverticulitis.   CT 10/17/23- Findings compatible with acute diverticulitis of the upper sigmoid colon. Cholelithiasis and slight gallbladder wall thickening. No pericholecystic fluid or biliary duct dilation. A lobulated right superior periuterine mass measuring 7 x 5.5 x 7 cm may be a pedunculated myoma, with masses of other etiology not excluded. Follow-up pelvic ultrasound can be performed for further evaluation in this regard.  F/U TC US dated 10/30/23: Enlarged multifibroid uterus including a 7.2 cm dominant right fundal pedunculated fibroid on a stalk. 8.9 cm heterogeneous left adnexal mass of uncertain etiology. Left ovary was not visualized. Pedunculated fibroid versus left adnexal mass is not excluded. Recommend MRI pelvis with and without contrast for further clarification.  MRI 23: Bilateral ovarian masses and findings suspicious for peritoneal carcinomatosis with small volume ascites. In light of the elevated CA-125 levels, primary ovarian malignancy is most likely although differential would include ovarian metastases.  Patient underwent TAHBSO with OMx and resection of diaphragmatic lesions wIth Dr. Moser on 23:  Pathology: Final Diagnosis 1.   Right fallopian tube and ovary - High grade serous carcinoma involving ovary - Benign fallopian tube  2.    Left fallopian tube and ovary - High grade serous carcinoma involving ovary and fallopian tube  3.   Uterus and cervix - Endometrium with small focus of atypical hyperplasia, see note - Myometrium with leiomyoma - Benign cervix  Note:  The entire endometrium has been submitted for microscopic examination.  4.   Omentum - Benign fibroadipose tissue  5.   Right diaphragmatic nodule - High grade serous carcinoma involving fibrous tissue, see note  Note:  The tumor is best seen on the permanent stained slide.  6  Right diaphragmatic nodule #2 - High grade serous carcinoma involving fibrous tissue, see note  Note:  The tumor is best seen on the permanent stained slide.  7.   Nodule over inferior vena cava - High grade serous carcinoma  involving fibrous tissue  Synoptic Summary 2: Ovary, Fallopian Tube, or Primary Perito Specimen Procedure:  Total hysterectomy and bilateral salpingo-oophorectomy;  Omentectomy Right Ovary Integrity:   Capsule intact Left Ovary Integrity:   Capsule ruptured Right Fallopian Tube Integrity:   Serosa intact Left Fallopian Tube Integrity:   Serosa intact Tumor Tumor Site:  Bilateral ovaries Tumor Size:  9 Centimeters (cm) Histologic Type:   High grade serous carcinoma Histologic Grade:   High grade Ovarian Surface Involvement:   Not identified Fallopian Tube Surface Involvement:    Not identified Other Tissue / Organ Involvement:    Pelvic peritoneum;  Abdominal peritoneum Largest Extrapelvic Peritoneal Focus:    Macroscopic (2 cm or less) Peritoneal / Ascitic Fluid Involvement:    Not submitted / unknown Chemotherapy Response Score (CRS):    No known presurgical therapy Regional Lymph Nodes Regional Lymph Node Status:   Not applicable (no regional lymph nodes submitted or found)  pTNM Classification (AJCC 8th Edition) pT Category:   pT3b pN Category:   pN not assigned (no nodes submitted or found) FIGO Stage:  IIIB  Screenings: Colonoscopy: - normal repeat 10 years PAP: 2023- normal Mammo: 2023- abnormal- biopsy negative per pt   GYNHx; h/o 1 abnormal pap in 2019- HPV +, colpo and f/b cryo. h/o fibroid 11 years ago. denies ovarian cyst. 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 Started Zejula 5/28. Tolerating well. Metal taste has resolved Reports feeling well  PET 5/16/2024

## 2024-10-02 NOTE — CONSULT LETTER
[Dear  ___] : Dear ~SHAILESH, [Consult Letter:] : I had the pleasure of evaluating your patient, [unfilled]. [Please see my note below.] : Please see my note below. [Consult Closing:] : Thank you very much for allowing me to participate in the care of this patient.  If you have any questions, please do not hesitate to contact me. [Sincerely,] : Sincerely, [FreeTextEntry3] : Chelsey Lyn MD  of Medicine [facility] of Medicine [facility]

## 2024-10-02 NOTE — CONSULT LETTER
[Dear  ___] : Dear ~SHAILESH, [Consult Letter:] : I had the pleasure of evaluating your patient, [unfilled]. [Please see my note below.] : Please see my note below. [Consult Closing:] : Thank you very much for allowing me to participate in the care of this patient.  If you have any questions, please do not hesitate to contact me. [Sincerely,] : Sincerely, [FreeTextEntry3] : Chelsey Lyn MD  of Medicine [facility] of Medicine [facility]

## 2024-10-02 NOTE — REVIEW OF SYSTEMS
[Diarrhea: Grade 0] : Diarrhea: Grade 0 [Negative] : Allergic/Immunologic [Abdominal Pain] : no abdominal pain [Vomiting] : no vomiting [de-identified] : neuropathy intoes improving. Fingers almost resolved

## 2024-10-09 ENCOUNTER — RESULT REVIEW (OUTPATIENT)
Age: 64
End: 2024-10-09

## 2024-10-09 ENCOUNTER — APPOINTMENT (OUTPATIENT)
Dept: HEMATOLOGY ONCOLOGY | Facility: CLINIC | Age: 64
End: 2024-10-09

## 2024-10-16 ENCOUNTER — APPOINTMENT (OUTPATIENT)
Dept: CARDIOLOGY | Facility: CLINIC | Age: 64
End: 2024-10-16
Payer: COMMERCIAL

## 2024-10-16 PROCEDURE — ZZZZZ: CPT | Mod: NC

## 2024-10-16 PROCEDURE — 93351 STRESS TTE COMPLETE: CPT

## 2024-10-23 ENCOUNTER — RESULT REVIEW (OUTPATIENT)
Age: 64
End: 2024-10-23

## 2024-10-23 ENCOUNTER — LABORATORY RESULT (OUTPATIENT)
Age: 64
End: 2024-10-23

## 2024-10-23 ENCOUNTER — APPOINTMENT (OUTPATIENT)
Dept: HEMATOLOGY ONCOLOGY | Facility: CLINIC | Age: 64
End: 2024-10-23
Payer: COMMERCIAL

## 2024-10-23 VITALS
DIASTOLIC BLOOD PRESSURE: 80 MMHG | TEMPERATURE: 98.3 F | HEIGHT: 62 IN | BODY MASS INDEX: 29.96 KG/M2 | HEART RATE: 84 BPM | RESPIRATION RATE: 18 BRPM | SYSTOLIC BLOOD PRESSURE: 145 MMHG | WEIGHT: 162.8 LBS

## 2024-10-23 DIAGNOSIS — G62.0 DRUG-INDUCED POLYNEUROPATHY: ICD-10-CM

## 2024-10-23 DIAGNOSIS — D64.89 OTHER SPECIFIED ANEMIAS: ICD-10-CM

## 2024-10-23 DIAGNOSIS — C57.4 MALIGNANT NEOPLASM OF UTERINE ADNEXA, UNSPECIFIED: ICD-10-CM

## 2024-10-23 DIAGNOSIS — C78.6 SECONDARY MALIGNANT NEOPLASM OF RETROPERITONEUM AND PERITONEUM: ICD-10-CM

## 2024-10-23 DIAGNOSIS — T45.1X5A DRUG-INDUCED POLYNEUROPATHY: ICD-10-CM

## 2024-10-23 PROCEDURE — 99214 OFFICE O/P EST MOD 30 MIN: CPT

## 2024-10-23 PROCEDURE — 36415 COLL VENOUS BLD VENIPUNCTURE: CPT

## 2024-10-31 ENCOUNTER — LABORATORY RESULT (OUTPATIENT)
Age: 64
End: 2024-10-31

## 2024-10-31 ENCOUNTER — NON-APPOINTMENT (OUTPATIENT)
Age: 64
End: 2024-10-31

## 2024-10-31 ENCOUNTER — APPOINTMENT (OUTPATIENT)
Dept: HEART AND VASCULAR | Facility: CLINIC | Age: 64
End: 2024-10-31
Payer: COMMERCIAL

## 2024-10-31 VITALS
DIASTOLIC BLOOD PRESSURE: 78 MMHG | SYSTOLIC BLOOD PRESSURE: 140 MMHG | BODY MASS INDEX: 27.6 KG/M2 | HEART RATE: 83 BPM | WEIGHT: 150 LBS | HEIGHT: 62 IN | OXYGEN SATURATION: 97 %

## 2024-10-31 DIAGNOSIS — E78.5 HYPERLIPIDEMIA, UNSPECIFIED: ICD-10-CM

## 2024-10-31 DIAGNOSIS — I10 ESSENTIAL (PRIMARY) HYPERTENSION: ICD-10-CM

## 2024-10-31 PROCEDURE — 93000 ELECTROCARDIOGRAM COMPLETE: CPT

## 2024-10-31 PROCEDURE — 99214 OFFICE O/P EST MOD 30 MIN: CPT | Mod: 25

## 2024-10-31 RX ORDER — AMLODIPINE BESYLATE 5 MG/1
5 TABLET ORAL DAILY
Qty: 90 | Refills: 3 | Status: ACTIVE | COMMUNITY
Start: 2024-10-31 | End: 1900-01-01

## 2024-11-05 LAB
CHOLEST SERPL-MCNC: 240 MG/DL
HDLC SERPL-MCNC: 67 MG/DL
LDLC SERPL CALC-MCNC: 154 MG/DL
NONHDLC SERPL-MCNC: 172 MG/DL
TRIGL SERPL-MCNC: 104 MG/DL

## 2024-11-20 ENCOUNTER — APPOINTMENT (OUTPATIENT)
Dept: GYNECOLOGIC ONCOLOGY | Facility: CLINIC | Age: 64
End: 2024-11-20
Payer: COMMERCIAL

## 2024-11-20 VITALS — SYSTOLIC BLOOD PRESSURE: 166 MMHG | OXYGEN SATURATION: 100 % | DIASTOLIC BLOOD PRESSURE: 84 MMHG | HEART RATE: 96 BPM

## 2024-11-20 DIAGNOSIS — C57.4 MALIGNANT NEOPLASM OF UTERINE ADNEXA, UNSPECIFIED: ICD-10-CM

## 2024-11-20 PROCEDURE — 99213 OFFICE O/P EST LOW 20 MIN: CPT

## 2024-11-20 PROCEDURE — 99459 PELVIC EXAMINATION: CPT

## 2024-11-20 PROCEDURE — G2211 COMPLEX E/M VISIT ADD ON: CPT | Mod: NC

## 2024-11-21 ENCOUNTER — TRANSCRIPTION ENCOUNTER (OUTPATIENT)
Age: 64
End: 2024-11-21

## 2024-11-21 RX ORDER — LOSARTAN POTASSIUM 100 MG/1
100 TABLET, FILM COATED ORAL DAILY
Qty: 90 | Refills: 3 | Status: ACTIVE | COMMUNITY
Start: 2024-11-21 | End: 1900-01-01

## 2024-11-22 ENCOUNTER — RESULT REVIEW (OUTPATIENT)
Age: 64
End: 2024-11-22

## 2024-11-22 ENCOUNTER — APPOINTMENT (OUTPATIENT)
Dept: HEMATOLOGY ONCOLOGY | Facility: CLINIC | Age: 64
End: 2024-11-22

## 2024-11-27 ENCOUNTER — TRANSCRIPTION ENCOUNTER (OUTPATIENT)
Age: 64
End: 2024-11-27

## 2024-11-29 ENCOUNTER — RESULT REVIEW (OUTPATIENT)
Age: 64
End: 2024-11-29

## 2024-12-04 ENCOUNTER — RESULT REVIEW (OUTPATIENT)
Age: 64
End: 2024-12-04

## 2024-12-04 ENCOUNTER — APPOINTMENT (OUTPATIENT)
Dept: HEMATOLOGY ONCOLOGY | Facility: CLINIC | Age: 64
End: 2024-12-04
Payer: COMMERCIAL

## 2024-12-04 VITALS
HEART RATE: 90 BPM | DIASTOLIC BLOOD PRESSURE: 72 MMHG | WEIGHT: 160.5 LBS | TEMPERATURE: 98.3 F | OXYGEN SATURATION: 98 % | SYSTOLIC BLOOD PRESSURE: 144 MMHG | RESPIRATION RATE: 90 BRPM | HEIGHT: 62 IN | BODY MASS INDEX: 29.53 KG/M2

## 2024-12-04 DIAGNOSIS — C57.4 MALIGNANT NEOPLASM OF UTERINE ADNEXA, UNSPECIFIED: ICD-10-CM

## 2024-12-04 DIAGNOSIS — C78.6 SECONDARY MALIGNANT NEOPLASM OF RETROPERITONEUM AND PERITONEUM: ICD-10-CM

## 2024-12-04 DIAGNOSIS — I10 ESSENTIAL (PRIMARY) HYPERTENSION: ICD-10-CM

## 2024-12-04 LAB — CANCER AG125 SERPL-ACNC: 10 U/ML

## 2024-12-04 PROCEDURE — 36415 COLL VENOUS BLD VENIPUNCTURE: CPT

## 2024-12-04 PROCEDURE — 99213 OFFICE O/P EST LOW 20 MIN: CPT

## 2024-12-18 ENCOUNTER — RESULT REVIEW (OUTPATIENT)
Age: 64
End: 2024-12-18

## 2024-12-18 ENCOUNTER — APPOINTMENT (OUTPATIENT)
Dept: HEMATOLOGY ONCOLOGY | Facility: CLINIC | Age: 64
End: 2024-12-18

## 2024-12-19 ENCOUNTER — TRANSCRIPTION ENCOUNTER (OUTPATIENT)
Age: 64
End: 2024-12-19

## 2024-12-20 ENCOUNTER — TRANSCRIPTION ENCOUNTER (OUTPATIENT)
Age: 64
End: 2024-12-20

## 2025-01-15 ENCOUNTER — NON-APPOINTMENT (OUTPATIENT)
Age: 65
End: 2025-01-15

## 2025-01-15 ENCOUNTER — RESULT REVIEW (OUTPATIENT)
Age: 65
End: 2025-01-15

## 2025-01-15 ENCOUNTER — APPOINTMENT (OUTPATIENT)
Dept: HEMATOLOGY ONCOLOGY | Facility: CLINIC | Age: 65
End: 2025-01-15
Payer: COMMERCIAL

## 2025-01-15 VITALS
TEMPERATURE: 98.3 F | OXYGEN SATURATION: 98 % | HEIGHT: 62 IN | RESPIRATION RATE: 16 BRPM | SYSTOLIC BLOOD PRESSURE: 169 MMHG | BODY MASS INDEX: 28.22 KG/M2 | HEART RATE: 93 BPM | DIASTOLIC BLOOD PRESSURE: 75 MMHG | WEIGHT: 153.38 LBS

## 2025-01-15 DIAGNOSIS — C57.4 MALIGNANT NEOPLASM OF UTERINE ADNEXA, UNSPECIFIED: ICD-10-CM

## 2025-01-15 DIAGNOSIS — C78.6 SECONDARY MALIGNANT NEOPLASM OF RETROPERITONEUM AND PERITONEUM: ICD-10-CM

## 2025-01-15 DIAGNOSIS — D25.9 LEIOMYOMA OF UTERUS, UNSPECIFIED: ICD-10-CM

## 2025-01-15 PROCEDURE — 36415 COLL VENOUS BLD VENIPUNCTURE: CPT

## 2025-01-15 PROCEDURE — 99213 OFFICE O/P EST LOW 20 MIN: CPT

## 2025-01-16 LAB — CANCER AG125 SERPL-ACNC: 12 U/ML

## 2025-02-14 ENCOUNTER — APPOINTMENT (OUTPATIENT)
Dept: GYNECOLOGIC ONCOLOGY | Facility: CLINIC | Age: 65
End: 2025-02-14
Payer: COMMERCIAL

## 2025-02-14 VITALS — HEART RATE: 98 BPM | OXYGEN SATURATION: 100 % | DIASTOLIC BLOOD PRESSURE: 80 MMHG | SYSTOLIC BLOOD PRESSURE: 146 MMHG

## 2025-02-14 DIAGNOSIS — C78.6 SECONDARY MALIGNANT NEOPLASM OF RETROPERITONEUM AND PERITONEUM: ICD-10-CM

## 2025-02-14 DIAGNOSIS — C57.4 MALIGNANT NEOPLASM OF UTERINE ADNEXA, UNSPECIFIED: ICD-10-CM

## 2025-02-14 PROCEDURE — 99215 OFFICE O/P EST HI 40 MIN: CPT

## 2025-02-14 PROCEDURE — 99459 PELVIC EXAMINATION: CPT

## 2025-02-14 PROCEDURE — G2211 COMPLEX E/M VISIT ADD ON: CPT | Mod: NC

## 2025-02-19 ENCOUNTER — RESULT REVIEW (OUTPATIENT)
Age: 65
End: 2025-02-19

## 2025-02-26 ENCOUNTER — RESULT REVIEW (OUTPATIENT)
Age: 65
End: 2025-02-26

## 2025-02-26 ENCOUNTER — APPOINTMENT (OUTPATIENT)
Dept: HEMATOLOGY ONCOLOGY | Facility: CLINIC | Age: 65
End: 2025-02-26
Payer: COMMERCIAL

## 2025-02-26 VITALS
BODY MASS INDEX: 27.97 KG/M2 | DIASTOLIC BLOOD PRESSURE: 77 MMHG | TEMPERATURE: 98.1 F | RESPIRATION RATE: 18 BRPM | HEIGHT: 62 IN | HEART RATE: 94 BPM | OXYGEN SATURATION: 100 % | WEIGHT: 152 LBS | SYSTOLIC BLOOD PRESSURE: 138 MMHG

## 2025-02-26 DIAGNOSIS — T45.1X5A DRUG-INDUCED POLYNEUROPATHY: ICD-10-CM

## 2025-02-26 DIAGNOSIS — G62.0 DRUG-INDUCED POLYNEUROPATHY: ICD-10-CM

## 2025-02-26 DIAGNOSIS — C57.4 MALIGNANT NEOPLASM OF UTERINE ADNEXA, UNSPECIFIED: ICD-10-CM

## 2025-02-26 DIAGNOSIS — C78.6 SECONDARY MALIGNANT NEOPLASM OF RETROPERITONEUM AND PERITONEUM: ICD-10-CM

## 2025-02-26 PROCEDURE — 99214 OFFICE O/P EST MOD 30 MIN: CPT

## 2025-04-28 ENCOUNTER — NON-APPOINTMENT (OUTPATIENT)
Age: 65
End: 2025-04-28

## 2025-04-30 ENCOUNTER — RESULT REVIEW (OUTPATIENT)
Age: 65
End: 2025-04-30

## 2025-04-30 ENCOUNTER — APPOINTMENT (OUTPATIENT)
Dept: HEMATOLOGY ONCOLOGY | Facility: CLINIC | Age: 65
End: 2025-04-30
Payer: COMMERCIAL

## 2025-04-30 VITALS
BODY MASS INDEX: 27.6 KG/M2 | DIASTOLIC BLOOD PRESSURE: 76 MMHG | RESPIRATION RATE: 17 BRPM | HEIGHT: 62 IN | SYSTOLIC BLOOD PRESSURE: 144 MMHG | HEART RATE: 91 BPM | OXYGEN SATURATION: 99 % | WEIGHT: 150 LBS | TEMPERATURE: 98.1 F

## 2025-04-30 DIAGNOSIS — C78.6 SECONDARY MALIGNANT NEOPLASM OF RETROPERITONEUM AND PERITONEUM: ICD-10-CM

## 2025-04-30 DIAGNOSIS — C57.4 MALIGNANT NEOPLASM OF UTERINE ADNEXA, UNSPECIFIED: ICD-10-CM

## 2025-04-30 PROCEDURE — 99214 OFFICE O/P EST MOD 30 MIN: CPT

## 2025-05-02 ENCOUNTER — TRANSCRIPTION ENCOUNTER (OUTPATIENT)
Age: 65
End: 2025-05-02

## 2025-05-30 ENCOUNTER — APPOINTMENT (OUTPATIENT)
Dept: GYNECOLOGIC ONCOLOGY | Facility: CLINIC | Age: 65
End: 2025-05-30
Payer: COMMERCIAL

## 2025-05-30 ENCOUNTER — RESULT REVIEW (OUTPATIENT)
Age: 65
End: 2025-05-30

## 2025-05-30 VITALS — HEART RATE: 92 BPM | SYSTOLIC BLOOD PRESSURE: 150 MMHG | OXYGEN SATURATION: 100 % | DIASTOLIC BLOOD PRESSURE: 74 MMHG

## 2025-05-30 DIAGNOSIS — C78.6 SECONDARY MALIGNANT NEOPLASM OF RETROPERITONEUM AND PERITONEUM: ICD-10-CM

## 2025-05-30 DIAGNOSIS — C57.4 MALIGNANT NEOPLASM OF UTERINE ADNEXA, UNSPECIFIED: ICD-10-CM

## 2025-05-30 PROCEDURE — 99215 OFFICE O/P EST HI 40 MIN: CPT

## 2025-05-30 PROCEDURE — G2211 COMPLEX E/M VISIT ADD ON: CPT | Mod: NC

## 2025-06-02 ENCOUNTER — NON-APPOINTMENT (OUTPATIENT)
Age: 65
End: 2025-06-02

## 2025-06-06 ENCOUNTER — RESULT REVIEW (OUTPATIENT)
Age: 65
End: 2025-06-06

## 2025-06-10 ENCOUNTER — APPOINTMENT (OUTPATIENT)
Dept: GYNECOLOGIC ONCOLOGY | Facility: CLINIC | Age: 65
End: 2025-06-10

## 2025-06-16 ENCOUNTER — TRANSCRIPTION ENCOUNTER (OUTPATIENT)
Age: 65
End: 2025-06-16

## 2025-06-17 ENCOUNTER — RESULT REVIEW (OUTPATIENT)
Age: 65
End: 2025-06-17

## 2025-06-18 ENCOUNTER — RESULT REVIEW (OUTPATIENT)
Age: 65
End: 2025-06-18

## 2025-06-26 ENCOUNTER — APPOINTMENT (OUTPATIENT)
Dept: HEMATOLOGY ONCOLOGY | Facility: CLINIC | Age: 65
End: 2025-06-26

## 2025-06-26 PROCEDURE — 99214 OFFICE O/P EST MOD 30 MIN: CPT | Mod: 95

## 2025-06-26 RX ORDER — APIXABAN 5 MG/1
5 TABLET, FILM COATED ORAL
Qty: 180 | Refills: 2 | Status: ACTIVE | COMMUNITY
Start: 2025-06-16 | End: 1900-01-01

## 2025-07-09 ENCOUNTER — RESULT REVIEW (OUTPATIENT)
Age: 65
End: 2025-07-09

## 2025-07-09 ENCOUNTER — APPOINTMENT (OUTPATIENT)
Dept: HEMATOLOGY ONCOLOGY | Facility: CLINIC | Age: 65
End: 2025-07-09

## 2025-07-09 VITALS
RESPIRATION RATE: 17 BRPM | DIASTOLIC BLOOD PRESSURE: 77 MMHG | SYSTOLIC BLOOD PRESSURE: 136 MMHG | BODY MASS INDEX: 27.05 KG/M2 | TEMPERATURE: 97.8 F | HEART RATE: 80 BPM | HEIGHT: 62 IN | OXYGEN SATURATION: 97 % | WEIGHT: 147 LBS

## 2025-07-23 ENCOUNTER — NON-APPOINTMENT (OUTPATIENT)
Age: 65
End: 2025-07-23

## 2025-07-30 ENCOUNTER — RESULT REVIEW (OUTPATIENT)
Age: 65
End: 2025-07-30

## 2025-07-31 DIAGNOSIS — R92.8 OTHER ABNORMAL AND INCONCLUSIVE FINDINGS ON DIAGNOSTIC IMAGING OF BREAST: ICD-10-CM

## 2025-08-05 DIAGNOSIS — R92.1 MAMMOGRAPHIC CALCIFICATION FOUND ON DIAGNOSTIC IMAGING OF BREAST: ICD-10-CM

## 2025-08-06 ENCOUNTER — APPOINTMENT (OUTPATIENT)
Dept: HEMATOLOGY ONCOLOGY | Facility: CLINIC | Age: 65
End: 2025-08-06
Payer: COMMERCIAL

## 2025-08-06 ENCOUNTER — RESULT REVIEW (OUTPATIENT)
Age: 65
End: 2025-08-06

## 2025-08-06 ENCOUNTER — NON-APPOINTMENT (OUTPATIENT)
Age: 65
End: 2025-08-06

## 2025-08-06 VITALS
HEART RATE: 80 BPM | SYSTOLIC BLOOD PRESSURE: 140 MMHG | TEMPERATURE: 97.8 F | RESPIRATION RATE: 16 BRPM | HEIGHT: 62 IN | DIASTOLIC BLOOD PRESSURE: 76 MMHG | BODY MASS INDEX: 27.05 KG/M2 | WEIGHT: 147 LBS | OXYGEN SATURATION: 98 %

## 2025-08-06 DIAGNOSIS — I82.621 ACUTE EMBOLISM AND THROMBOSIS OF DEEP VEINS OF RIGHT UPPER EXTREMITY: ICD-10-CM

## 2025-08-06 DIAGNOSIS — R97.1 ELEVATED CANCER ANTIGEN 125 [CA 125]: ICD-10-CM

## 2025-08-06 PROCEDURE — 99214 OFFICE O/P EST MOD 30 MIN: CPT

## 2025-08-08 ENCOUNTER — RESULT REVIEW (OUTPATIENT)
Age: 65
End: 2025-08-08

## 2025-08-14 ENCOUNTER — NON-APPOINTMENT (OUTPATIENT)
Age: 65
End: 2025-08-14

## 2025-09-02 ENCOUNTER — NON-APPOINTMENT (OUTPATIENT)
Age: 65
End: 2025-09-02

## 2025-09-02 DIAGNOSIS — T45.1X5A DRUG-INDUCED POLYNEUROPATHY: ICD-10-CM

## 2025-09-02 DIAGNOSIS — C57.4 MALIGNANT NEOPLASM OF UTERINE ADNEXA, UNSPECIFIED: ICD-10-CM

## 2025-09-02 DIAGNOSIS — Z01.818 ENCOUNTER FOR OTHER PREPROCEDURAL EXAMINATION: ICD-10-CM

## 2025-09-02 DIAGNOSIS — R97.1 ELEVATED CANCER ANTIGEN 125 [CA 125]: ICD-10-CM

## 2025-09-02 DIAGNOSIS — R19.00 INTRA-ABDOMINAL AND PELVIC SWELLING, MASS AND LUMP, UNSPECIFIED SITE: ICD-10-CM

## 2025-09-02 DIAGNOSIS — Z13.1 ENCOUNTER FOR SCREENING FOR DIABETES MELLITUS: ICD-10-CM

## 2025-09-02 DIAGNOSIS — R18.8 OTHER ASCITES: ICD-10-CM

## 2025-09-02 DIAGNOSIS — E83.39 OTHER DISORDERS OF PHOSPHORUS METABOLISM: ICD-10-CM

## 2025-09-02 DIAGNOSIS — G62.0 DRUG-INDUCED POLYNEUROPATHY: ICD-10-CM

## 2025-09-02 DIAGNOSIS — K57.32 DIVERTICULITIS OF LARGE INTESTINE W/OUT PERFORATION OR ABSCESS W/OUT BLEEDING: ICD-10-CM

## 2025-09-02 DIAGNOSIS — G62.9 POLYNEUROPATHY, UNSPECIFIED: ICD-10-CM

## 2025-09-02 DIAGNOSIS — R92.8 OTHER ABNORMAL AND INCONCLUSIVE FINDINGS ON DIAGNOSTIC IMAGING OF BREAST: ICD-10-CM

## 2025-09-02 DIAGNOSIS — R92.1 MAMMOGRAPHIC CALCIFICATION FOUND ON DIAGNOSTIC IMAGING OF BREAST: ICD-10-CM

## 2025-09-02 DIAGNOSIS — D25.9 LEIOMYOMA OF UTERUS, UNSPECIFIED: ICD-10-CM

## 2025-09-02 DIAGNOSIS — K80.50 CALCULUS OF BILE DUCT W/OUT CHOLANGITIS OR CHOLECYSTITIS W/OUT OBSTRUCTION: ICD-10-CM

## 2025-09-02 DIAGNOSIS — D64.89 OTHER SPECIFIED ANEMIAS: ICD-10-CM

## 2025-09-03 ENCOUNTER — APPOINTMENT (OUTPATIENT)
Dept: HEMATOLOGY ONCOLOGY | Facility: CLINIC | Age: 65
End: 2025-09-03

## 2025-09-03 ENCOUNTER — RESULT REVIEW (OUTPATIENT)
Age: 65
End: 2025-09-03

## 2025-09-03 VITALS
HEIGHT: 62 IN | RESPIRATION RATE: 16 BRPM | OXYGEN SATURATION: 97 % | BODY MASS INDEX: 28.16 KG/M2 | WEIGHT: 153 LBS | TEMPERATURE: 98.1 F | DIASTOLIC BLOOD PRESSURE: 81 MMHG | SYSTOLIC BLOOD PRESSURE: 126 MMHG | HEART RATE: 80 BPM

## 2025-09-04 ENCOUNTER — APPOINTMENT (OUTPATIENT)
Dept: CARDIOLOGY | Facility: CLINIC | Age: 65
End: 2025-09-04
Payer: COMMERCIAL

## 2025-09-04 VITALS
SYSTOLIC BLOOD PRESSURE: 116 MMHG | BODY MASS INDEX: 28.16 KG/M2 | HEART RATE: 72 BPM | WEIGHT: 153 LBS | HEIGHT: 62 IN | DIASTOLIC BLOOD PRESSURE: 70 MMHG | OXYGEN SATURATION: 99 %

## 2025-09-04 DIAGNOSIS — R06.02 SHORTNESS OF BREATH: ICD-10-CM

## 2025-09-04 DIAGNOSIS — I10 ESSENTIAL (PRIMARY) HYPERTENSION: ICD-10-CM

## 2025-09-04 DIAGNOSIS — E66.3 OVERWEIGHT: ICD-10-CM

## 2025-09-04 DIAGNOSIS — I82.621 ACUTE EMBOLISM AND THROMBOSIS OF DEEP VEINS OF RIGHT UPPER EXTREMITY: ICD-10-CM

## 2025-09-04 DIAGNOSIS — E78.5 HYPERLIPIDEMIA, UNSPECIFIED: ICD-10-CM

## 2025-09-04 DIAGNOSIS — R94.39 ABNORMAL RESULT OF OTHER CARDIOVASCULAR FUNCTION STUDY: ICD-10-CM

## 2025-09-04 DIAGNOSIS — C78.6 SECONDARY MALIGNANT NEOPLASM OF RETROPERITONEUM AND PERITONEUM: ICD-10-CM

## 2025-09-04 PROCEDURE — 93000 ELECTROCARDIOGRAM COMPLETE: CPT

## 2025-09-04 PROCEDURE — 99205 OFFICE O/P NEW HI 60 MIN: CPT | Mod: 25

## 2025-09-05 PROBLEM — E66.3 OVERWEIGHT: Status: RESOLVED | Noted: 2023-10-19 | Resolved: 2025-09-05

## 2025-09-05 PROBLEM — R94.39 ABNORMAL STRESS TEST: Status: ACTIVE | Noted: 2025-09-05

## 2025-09-09 ENCOUNTER — NON-APPOINTMENT (OUTPATIENT)
Age: 65
End: 2025-09-09

## 2025-09-09 ENCOUNTER — APPOINTMENT (OUTPATIENT)
Facility: CLINIC | Age: 65
End: 2025-09-09
Payer: COMMERCIAL

## 2025-09-09 PROCEDURE — 92004 COMPRE OPH EXAM NEW PT 1/>: CPT

## 2025-09-10 ENCOUNTER — NON-APPOINTMENT (OUTPATIENT)
Age: 65
End: 2025-09-10

## 2025-09-11 RX ORDER — ONDANSETRON 4 MG/1
4 TABLET, ORALLY DISINTEGRATING ORAL
Qty: 20 | Refills: 3 | Status: ACTIVE | COMMUNITY
Start: 2025-09-11 | End: 1900-01-01

## 2025-09-15 ENCOUNTER — RESULT REVIEW (OUTPATIENT)
Age: 65
End: 2025-09-15

## 2025-09-17 ENCOUNTER — APPOINTMENT (OUTPATIENT)
Dept: GYNECOLOGIC ONCOLOGY | Facility: CLINIC | Age: 65
End: 2025-09-17
Payer: COMMERCIAL

## 2025-09-17 VITALS — HEART RATE: 88 BPM | DIASTOLIC BLOOD PRESSURE: 79 MMHG | OXYGEN SATURATION: 99 % | SYSTOLIC BLOOD PRESSURE: 117 MMHG

## 2025-09-17 DIAGNOSIS — R59.0 LOCALIZED ENLARGED LYMPH NODES: ICD-10-CM

## 2025-09-17 PROCEDURE — 99215 OFFICE O/P EST HI 40 MIN: CPT

## 2025-09-17 PROCEDURE — G2211 COMPLEX E/M VISIT ADD ON: CPT | Mod: NC

## 2025-09-17 PROCEDURE — 99459 PELVIC EXAMINATION: CPT

## 2025-09-19 PROBLEM — R59.0 RETROPERITONEAL LYMPHADENOPATHY: Status: ACTIVE | Noted: 2025-09-19

## 2025-09-19 PROBLEM — C77.2 METASTASIS TO RETROPERITONEAL LYMPH NODE: Status: ACTIVE | Noted: 2025-09-19

## 2025-09-19 RX ORDER — OLANZAPINE 2.5 MG/1
2.5 TABLET, FILM COATED ORAL
Qty: 30 | Refills: 2 | Status: ACTIVE | COMMUNITY
Start: 2025-09-19 | End: 1900-01-01

## (undated) DEVICE — Device

## (undated) DEVICE — LAP PAD 18 X 18"

## (undated) DEVICE — D HELP - CLEARVIEW CLEARIFY SYSTEM

## (undated) DEVICE — SPONGE ENDO PEANUT 5MM

## (undated) DEVICE — SUT VICRYL PLUS 0 36" CT-1

## (undated) DEVICE — VENODYNE/SCD SLEEVE CALF MEDIUM

## (undated) DEVICE — DRSG DERMABOND 0.7ML

## (undated) DEVICE — POSITIONER FOAM EGG CRATE ULNAR 2PCS (PINK)

## (undated) DEVICE — TROCAR COVIDIEN VERSAONE BLUNT TIP HASSAN 12MM

## (undated) DEVICE — HANDPIECE HARMONIC BLUE

## (undated) DEVICE — TROCAR COVIDIEN VERSAPORT BLADELESS OPTICAL 5MM STANDARD

## (undated) DEVICE — TIP METZENBAUM SCISSOR MONOPOLAR ENDOCUT (ORANGE)

## (undated) DEVICE — SOL IRR BAG NS 0.9% 3000ML

## (undated) DEVICE — TROCAR COVIDIEN VERSAONE FIXATION CANNULA 5MM

## (undated) DEVICE — UTERINE MANIPULATOR CONMED VCARE MED 34MM

## (undated) DEVICE — INSUFFLATION NDL ETHICON ENDOPATH PNEUMOPARITONEUM 120MM

## (undated) DEVICE — UTERINE MANIPULATOR CONMED VCARE SM 32MM

## (undated) DEVICE — NDL HYPO SAFE 18G X 1.5" (PINK)

## (undated) DEVICE — SUT VICRYL 0 36" CT-1 UNDYED

## (undated) DEVICE — SUT MONOCRYL 4-0 27" PS-2 UNDYED

## (undated) DEVICE — NDL HYPO SAFE 22G X 1.5" (BLACK)

## (undated) DEVICE — GLV 6.5 PROTEXIS (WHITE)

## (undated) DEVICE — ENDOCATCH II 15MM

## (undated) DEVICE — TROCAR COVIDIEN VERSAPORT BLADELESS OPTICAL 12MM STANDARD

## (undated) DEVICE — TROCAR COVIDIEN BLUNT TIP HASSAN 12MM

## (undated) DEVICE — PACK GENERAL LAPAROSCOPY

## (undated) DEVICE — INSUFFLATION NDL COVIDIEN SURGINEEDLE VERESS 120MM

## (undated) DEVICE — LIGASURE IMPACT

## (undated) DEVICE — FOLEY TRAY 16FR 5CC LF UMETER CLOSED

## (undated) DEVICE — WARMING BLANKET UPPER ADULT

## (undated) DEVICE — POSITIONER PINK PAD PIGAZZI SYSTEM XL W ARM PROTECTOR

## (undated) DEVICE — LIGASURE BLUNT TIP 37CM

## (undated) DEVICE — LIGASURE MARYLAND 37CM

## (undated) DEVICE — URETERAL CATH RED RUBBER 14FR (GREEN)

## (undated) DEVICE — NDL GRASPING DISP

## (undated) DEVICE — SUT SILK 3-0 18" SH (POP-OFF)

## (undated) DEVICE — TROCAR COVIDIEN BLUNT TIP HASSAN 10MM

## (undated) DEVICE — SUT SILK 3-0 30" SH

## (undated) DEVICE — SYR LUER LOK 30CC

## (undated) DEVICE — SUT PDS II PLUS 1 96" TP-1

## (undated) DEVICE — ENDOCATCH 10MM SPECIMEN POUCH

## (undated) DEVICE — PACK PERI GYN

## (undated) DEVICE — DRSG 2X2

## (undated) DEVICE — PREP BETADINE SPONGE STICKS

## (undated) DEVICE — TUBING STRYKER PNEUMOCLEAR HIGH FLOW HEATED

## (undated) DEVICE — SUT VICRYL 2-0 27" CT-1

## (undated) DEVICE — SUT VICRYL 0 27" UR-5

## (undated) DEVICE — TUBING STRYKER PNEUMOCLEAR SMOKE EVACUATION HIGH FLOW

## (undated) DEVICE — SUT VLOC 180 0 12" GS-21 GREEN

## (undated) DEVICE — SUT VICRYL 0 27" UR-6

## (undated) DEVICE — UTERINE MANIPULATOR CONMED VCARE LG 37MM

## (undated) DEVICE — SUT PROLENE 1 30" CT

## (undated) DEVICE — TROCAR COVIDIEN VERSAONE BLADELESS FIXATION 12MM STANDARD

## (undated) DEVICE — SHEARS HARMONIC HD 1000I 5MM X 36CM CURVED TIP